# Patient Record
Sex: FEMALE | Race: WHITE | NOT HISPANIC OR LATINO
[De-identification: names, ages, dates, MRNs, and addresses within clinical notes are randomized per-mention and may not be internally consistent; named-entity substitution may affect disease eponyms.]

---

## 2018-08-14 ENCOUNTER — TRANSCRIPTION ENCOUNTER (OUTPATIENT)
Age: 38
End: 2018-08-14

## 2018-08-14 ENCOUNTER — INPATIENT (INPATIENT)
Facility: HOSPITAL | Age: 38
LOS: 0 days | Discharge: HOME | End: 2018-08-15
Attending: SPECIALIST | Admitting: SPECIALIST
Payer: MEDICAID

## 2018-08-14 VITALS
TEMPERATURE: 98 F | RESPIRATION RATE: 16 BRPM | DIASTOLIC BLOOD PRESSURE: 82 MMHG | SYSTOLIC BLOOD PRESSURE: 150 MMHG | HEART RATE: 63 BPM | OXYGEN SATURATION: 100 %

## 2018-08-14 DIAGNOSIS — K80.20 CALCULUS OF GALLBLADDER WITHOUT CHOLECYSTITIS WITHOUT OBSTRUCTION: ICD-10-CM

## 2018-08-14 LAB
ALBUMIN SERPL ELPH-MCNC: 4.3 G/DL — SIGNIFICANT CHANGE UP (ref 3.3–5)
ALP SERPL-CCNC: 68 U/L — SIGNIFICANT CHANGE UP (ref 40–120)
ALT FLD-CCNC: 21 U/L — SIGNIFICANT CHANGE UP (ref 4–33)
APTT BLD: 32.4 SEC — SIGNIFICANT CHANGE UP (ref 27.5–37.4)
AST SERPL-CCNC: 18 U/L — SIGNIFICANT CHANGE UP (ref 4–32)
BILIRUB SERPL-MCNC: 0.3 MG/DL — SIGNIFICANT CHANGE UP (ref 0.2–1.2)
BLD GP AB SCN SERPL QL: NEGATIVE — SIGNIFICANT CHANGE UP
BUN SERPL-MCNC: 10 MG/DL — SIGNIFICANT CHANGE UP (ref 7–23)
CALCIUM SERPL-MCNC: 9.3 MG/DL — SIGNIFICANT CHANGE UP (ref 8.4–10.5)
CHLORIDE SERPL-SCNC: 99 MMOL/L — SIGNIFICANT CHANGE UP (ref 98–107)
CO2 SERPL-SCNC: 21 MMOL/L — LOW (ref 22–31)
CREAT SERPL-MCNC: 0.59 MG/DL — SIGNIFICANT CHANGE UP (ref 0.5–1.3)
GLUCOSE SERPL-MCNC: 104 MG/DL — HIGH (ref 70–99)
HCT VFR BLD CALC: 40.2 % — SIGNIFICANT CHANGE UP (ref 34.5–45)
HGB BLD-MCNC: 13.4 G/DL — SIGNIFICANT CHANGE UP (ref 11.5–15.5)
INR BLD: 1.02 — SIGNIFICANT CHANGE UP (ref 0.88–1.17)
LIDOCAIN IGE QN: 32.1 U/L — SIGNIFICANT CHANGE UP (ref 7–60)
MCHC RBC-ENTMCNC: 27.7 PG — SIGNIFICANT CHANGE UP (ref 27–34)
MCHC RBC-ENTMCNC: 33.3 % — SIGNIFICANT CHANGE UP (ref 32–36)
MCV RBC AUTO: 83.1 FL — SIGNIFICANT CHANGE UP (ref 80–100)
NRBC # FLD: 0 — SIGNIFICANT CHANGE UP
PLATELET # BLD AUTO: 295 K/UL — SIGNIFICANT CHANGE UP (ref 150–400)
PMV BLD: 9.4 FL — SIGNIFICANT CHANGE UP (ref 7–13)
POTASSIUM SERPL-MCNC: 4.1 MMOL/L — SIGNIFICANT CHANGE UP (ref 3.5–5.3)
POTASSIUM SERPL-SCNC: 4.1 MMOL/L — SIGNIFICANT CHANGE UP (ref 3.5–5.3)
PROT SERPL-MCNC: 7.8 G/DL — SIGNIFICANT CHANGE UP (ref 6–8.3)
PROTHROM AB SERPL-ACNC: 11.3 SEC — SIGNIFICANT CHANGE UP (ref 9.8–13.1)
RBC # BLD: 4.84 M/UL — SIGNIFICANT CHANGE UP (ref 3.8–5.2)
RBC # FLD: 12.4 % — SIGNIFICANT CHANGE UP (ref 10.3–14.5)
RH IG SCN BLD-IMP: NEGATIVE — SIGNIFICANT CHANGE UP
SODIUM SERPL-SCNC: 133 MMOL/L — LOW (ref 135–145)
WBC # BLD: 13 K/UL — HIGH (ref 3.8–10.5)
WBC # FLD AUTO: 13 K/UL — HIGH (ref 3.8–10.5)

## 2018-08-14 PROCEDURE — 76705 ECHO EXAM OF ABDOMEN: CPT | Mod: 26

## 2018-08-14 RX ORDER — KETOROLAC TROMETHAMINE 30 MG/ML
15 SYRINGE (ML) INJECTION ONCE
Qty: 0 | Refills: 0 | Status: DISCONTINUED | OUTPATIENT
Start: 2018-08-14 | End: 2018-08-14

## 2018-08-14 RX ORDER — ACETAMINOPHEN 500 MG
1000 TABLET ORAL ONCE
Qty: 0 | Refills: 0 | Status: COMPLETED | OUTPATIENT
Start: 2018-08-14 | End: 2018-08-14

## 2018-08-14 RX ORDER — MORPHINE SULFATE 50 MG/1
4 CAPSULE, EXTENDED RELEASE ORAL ONCE
Qty: 0 | Refills: 0 | Status: DISCONTINUED | OUTPATIENT
Start: 2018-08-14 | End: 2018-08-14

## 2018-08-14 RX ORDER — PIPERACILLIN AND TAZOBACTAM 4; .5 G/20ML; G/20ML
3.38 INJECTION, POWDER, LYOPHILIZED, FOR SOLUTION INTRAVENOUS ONCE
Qty: 0 | Refills: 0 | Status: COMPLETED | OUTPATIENT
Start: 2018-08-14 | End: 2018-08-14

## 2018-08-14 RX ORDER — LIDOCAINE 4 G/100G
10 CREAM TOPICAL ONCE
Qty: 0 | Refills: 0 | Status: COMPLETED | OUTPATIENT
Start: 2018-08-14 | End: 2018-08-14

## 2018-08-14 RX ORDER — SODIUM CHLORIDE 9 MG/ML
1000 INJECTION, SOLUTION INTRAVENOUS
Qty: 0 | Refills: 0 | Status: DISCONTINUED | OUTPATIENT
Start: 2018-08-14 | End: 2018-08-15

## 2018-08-14 RX ADMIN — SODIUM CHLORIDE 100 MILLILITER(S): 9 INJECTION, SOLUTION INTRAVENOUS at 23:29

## 2018-08-14 RX ADMIN — PIPERACILLIN AND TAZOBACTAM 200 GRAM(S): 4; .5 INJECTION, POWDER, LYOPHILIZED, FOR SOLUTION INTRAVENOUS at 17:40

## 2018-08-14 RX ADMIN — Medication 15 MILLIGRAM(S): at 17:39

## 2018-08-14 RX ADMIN — SODIUM CHLORIDE 100 MILLILITER(S): 9 INJECTION, SOLUTION INTRAVENOUS at 18:59

## 2018-08-14 RX ADMIN — Medication 400 MILLIGRAM(S): at 23:28

## 2018-08-14 RX ADMIN — PIPERACILLIN AND TAZOBACTAM 3.38 GRAM(S): 4; .5 INJECTION, POWDER, LYOPHILIZED, FOR SOLUTION INTRAVENOUS at 17:58

## 2018-08-14 RX ADMIN — MORPHINE SULFATE 4 MILLIGRAM(S): 50 CAPSULE, EXTENDED RELEASE ORAL at 17:27

## 2018-08-14 RX ADMIN — LIDOCAINE 10 MILLILITER(S): 4 CREAM TOPICAL at 13:10

## 2018-08-14 RX ADMIN — Medication 15 MILLIGRAM(S): at 17:40

## 2018-08-14 RX ADMIN — MORPHINE SULFATE 4 MILLIGRAM(S): 50 CAPSULE, EXTENDED RELEASE ORAL at 17:40

## 2018-08-14 RX ADMIN — Medication 30 MILLILITER(S): at 13:10

## 2018-08-14 NOTE — PROCEDURE NOTE - ADDITIONAL PROCEDURE DETAILS
Emergency Department Focused Ultrasound performed at patient's bedside for educational purposes. The study will have a follow up US performed by radiology.

## 2018-08-14 NOTE — H&P ADULT - ASSESSMENT
37F w/epigastric and RUQ pain with n obstructing 1.5cm gallstone within the gallbladder neck.      - admit to surgical service under Dr. Garth Boyce  - NPO / IVFs  - type and screen x2, urine HCG, coags appreciated  - plan for the OR this evening for laparoscopic cholecystectomy w/possible intraoperative cholangiogram  - above plan d/w Dr. Boyce      Johan [facility] PGY3  [id number]

## 2018-08-14 NOTE — ED ADULT TRIAGE NOTE - CHIEF COMPLAINT QUOTE
Pt complaining of intermittent abdominal pain since Sunday and N/V. Pt denies chest pain, SOB, fever or chills.

## 2018-08-14 NOTE — ED PROVIDER NOTE - CARE PLAN
Principal Discharge DX:	Epigastric abdominal pain Principal Discharge DX:	Impacted gallstone of gallbladder

## 2018-08-14 NOTE — H&P ADULT - NSHPREVIEWOFSYSTEMS_GEN_ALL_CORE
Review of Systems:   General: denies weight change, fever or fatigue  HEENT: denies sore throat, hoarseness  Respiratory: denies cough, shortness of breath at rest and on exertion, wheezing  Cardiovascular: denies chest pain, abnormal heart rhythm, PND, palpitations  Gastrointestinal: denies nausea, vomiting, diarrhea, bloody or black bowel movements  Genitourinary: denies frequent urination, painful urination, kidney disease  Neurological: denies seizures, headaches  Muscoloskeletal: denies any joint pains  Psychiatric: denies depression, anxiety Review of Systems:   General: denies weight change, fever or fatigue  HEENT: denies sore throat, hoarseness  Respiratory: denies cough, shortness of breath at rest and on exertion, wheezing  Cardiovascular: denies chest pain, abnormal heart rhythm, PND, palpitations  Gastrointestinal: +Epigastric/RUQ pain, currently denies nausea, vomiting, diarrhea, bloody or black bowel movements  Genitourinary: denies frequent urination, painful urination, kidney disease  Neurological: denies seizures, headaches

## 2018-08-14 NOTE — H&P ADULT - NSHPPHYSICALEXAM_GEN_ALL_CORE
Physical Exam:  General: Well developed, well nourished, alert and cooperative, and appears to be in no acute distress.  HEENT: normocephalic, vision is grossly intact  Neck: Neck supple, non-tender without lymphadenopathy, masses or thyromegaly  Chest: lungs clear bilaterally, non-labored breathing, no wheezing or rhonci  Cardiac: Regular rhythm, rate of , +S1 & S2, no murmurs, rubs or gallops  Abdomen: soft, non-distended, non-tender, no guarding or rebound  Extremities: No significant deformity or joint abnormality. No edema. Peripheral pulses intact. No varicosities.  MusculoskeletalL: Adequately aligned spine. ROM intact spine and extremities. No joint erythema or tenderness. Normal muscular development. Normal gait.  Neurological: no focal deficits, strength and sensation symmetric and intact throughout. Reflexes 2+ throughout.   Skin: Skin normal color, texture and turgor with no lesions or eruptions.    LABS:                        13.4   13.00 )-----------( 295      ( 14 Aug 2018 13:25 )             40.2     08-14    133<L>  |  99  |  10  ----------------------------<  104<H>  4.1   |  21<L>  |  0.59    Ca    9.3      14 Aug 2018 13:25    TPro  7.8  /  Alb  4.3  /  TBili  0.3  /  DBili  x   /  AST  18  /  ALT  21  /  AlkPhos  68  08-14    PT/INR - ( 14 Aug 2018 17:28 )   PT: 11.3 SEC;   INR: 1.02          PTT - ( 14 Aug 2018 17:28 )  PTT:32.4 SEC      RADIOLOGY & ADDITIONAL STUDIES: Physical Exam:  General: Well developed, well nourished, alert and cooperative, and appears to be in no acute distress.  HEENT: normocephalic, vision is grossly intact  Neck: Neck supple, non-tender without lymphadenopathy, masses or thyromegaly  Chest: lungs clear bilaterally, non-labored breathing, no wheezing or rhonci  Cardiac: Regular rhythm, rate of 68  Abdomen: soft, +RUQ tenderness, +jennings sign

## 2018-08-14 NOTE — ED PROVIDER NOTE - PROGRESS NOTE DETAILS
Dr. Mobley: I took over care of this pt at 16:00 today; US showing impacted gallstone with normal LFTs and normal AlkPhos; will call surgery Dr. Mobley: general surgery evaluated pt, recommending Zosyn and will speak with attending for possible admission for cholecystectomy Dr. Mobley: spoke to surgery, pt to be admitted to Dr. Boyce for cholecystectomy

## 2018-08-14 NOTE — H&P ADULT - NSHPLABSRESULTS_GEN_ALL_CORE
Vital Signs Last 24 Hrs  T(C): 36.7 (14 Aug 2018 20:09), Max: 36.9 (14 Aug 2018 09:58)  T(F): 98.1 (14 Aug 2018 20:09), Max: 98.5 (14 Aug 2018 09:58)  HR: 70 (14 Aug 2018 20:09) (63 - 70)  BP: 136/66 (14 Aug 2018 20:09) (114/69 - 150/82)  BP(mean): --  RR: 16 (14 Aug 2018 20:09) (16 - 16)  SpO2: 97% (14 Aug 2018 20:09) (97% - 100%)    I&O's Detail                            13.4   13.00 )-----------( 295      ( 14 Aug 2018 13:25 )             40.2       08-14    133<L>  |  99  |  10  ----------------------------<  104<H>  4.1   |  21<L>  |  0.59    Ca    9.3      14 Aug 2018 13:25    TPro  7.8  /  Alb  4.3  /  TBili  0.3  /  DBili  x   /  AST  18  /  ALT  21  /  AlkPhos  68  08-14      CAPILLARY BLOOD GLUCOSE          LIVER FUNCTIONS - ( 14 Aug 2018 13:25 )  Alb: 4.3 g/dL / Pro: 7.8 g/dL / ALK PHOS: 68 u/L / ALT: 21 u/L / AST: 18 u/L / GGT: x             PT/INR - ( 14 Aug 2018 17:28 )   PT: 11.3 SEC;   INR: 1.02          PTT - ( 14 Aug 2018 17:28 )  PTT:32.4 SEC      _________________________________________________________________________________________________________________________________________________________    Radiology  EXAM: US ABDOMEN LIMITED   PROCEDURE DATE: Aug 14 2018   INTERPRETATION: CLINICAL INFORMATION: Epigastric pain.   COMPARISON: None available.   TECHNIQUE: Sonography of the right upper quadrant.     FINDINGS:   Liver: Enlarged measuring 20.3 cm in craniocaudad dimension. Steatosis.   Bile ducts: Normal caliber. Common hepatic duct measures 4 mm.   Gallbladder: 1.5 cm stone impacted in gallbladder neck. No gallbladder wall thickening or pericolic fluid. Negative sonographic Davies's.   Pancreas: Visualized portions are within normal limits.   Right kidney: 10.6 cm. No hydronephrosis.   Ascites: None.   IVC: Visualized portions are within normal limits.     IMPRESSION: 1.5 cm stone impacted in gallbladder neck, however no evidence of acute cholecystitis at this time.

## 2018-08-14 NOTE — ED ADULT NURSE NOTE - NSIMPLEMENTINTERV_GEN_ALL_ED
Implemented All Universal Safety Interventions:  [place] to call system. Call bell, personal items and telephone within reach. Instruct patient to call for assistance. Room bathroom lighting operational. Non-slip footwear when patient is off stretcher. Physically safe environment: no spills, clutter or unnecessary equipment. Stretcher in lowest position, wheels locked, appropriate side rails in place.

## 2018-08-14 NOTE — H&P ADULT - HISTORY OF PRESENT ILLNESS
CC: abdominal pain  HPI: The patient is a 37 year old female with medical and surgical history significant only for EGD-proven gastritis who presents with four days of epigastric / RUQ pain with associated nausea and emesis, a leukocytosis to 13 and subsequent ultrasound imaging showing an obstructing 1.5cm gallstone within the gallbladder neck. No transaminitis or elevated alk phos. Never had symptoms like this prior. Patient is afebrile and hemodynamically stable. Denies shortness of breath or chest pain.     Medical or Surgical History  Anxiety      Review of Systems:   General: denies weight change, fever or fatigue  HEENT: denies sore throat, hoarseness  Respiratory: denies cough, shortness of breath at rest and on exertion, wheezing  Cardiovascular: denies chest pain, abnormal heart rhythm, PND, palpitations  Gastrointestinal: denies nausea, vomiting, diarrhea, bloody or black bowel movements  Genitourinary: denies frequent urination, painful urination, kidney disease  Neurological: denies seizures, headaches  Muscoloskeletal: denies any joint pains  Psychiatric: denies depression, anxiety    MEDICATIONS  (STANDING):  lactated ringers. 1000 milliLiter(s) (100 mL/Hr) IV Continuous <Continuous>    MEDICATIONS  (PRN):    Allergies    No Known Allergies    Intolerances        SOCIAL HISTORY:  Occupation:  Smoking Hx: denies  Etoh Hx: denies  IVDA Hx: denies    FAMILY HISTORY:    - unless noted, no significant family hx with Mother, Father, Siblings      Objective:   Vital Signs Last 24 Hrs  T(C): 36.9 (14 Aug 2018 16:30), Max: 36.9 (14 Aug 2018 09:58)  T(F): 98.4 (14 Aug 2018 16:30), Max: 98.5 (14 Aug 2018 09:58)  HR: 65 (14 Aug 2018 16:30) (63 - 65)  BP: 114/69 (14 Aug 2018 16:30) (114/69 - 150/82)  BP(mean): --  RR: 16 (14 Aug 2018 16:30) (16 - 16)  SpO2: 100% (14 Aug 2018 16:30) (100% - 100%)    Physical Exam:  General: Well developed, well nourished, alert and cooperative, and appears to be in no acute distress.  HEENT: normocephalic, vision is grossly intact  Neck: Neck supple, non-tender without lymphadenopathy, masses or thyromegaly  Chest: lungs clear bilaterally, non-labored breathing, no wheezing or rhonci  Cardiac: Regular rhythm, rate of , +S1 & S2, no murmurs, rubs or gallops  Abdomen: soft, non-distended, non-tender, no guarding or rebound  Extremities: No significant deformity or joint abnormality. No edema. Peripheral pulses intact. No varicosities.  MusculoskeletalL: Adequately aligned spine. ROM intact spine and extremities. No joint erythema or tenderness. Normal muscular development. Normal gait.  Neurological: no focal deficits, strength and sensation symmetric and intact throughout. Reflexes 2+ throughout.   Skin: Skin normal color, texture and turgor with no lesions or eruptions.    LABS:                        13.4   13.00 )-----------( 295      ( 14 Aug 2018 13:25 )             40.2     08-14    133<L>  |  99  |  10  ----------------------------<  104<H>  4.1   |  21<L>  |  0.59    Ca    9.3      14 Aug 2018 13:25    TPro  7.8  /  Alb  4.3  /  TBili  0.3  /  DBili  x   /  AST  18  /  ALT  21  /  AlkPhos  68  08-14    PT/INR - ( 14 Aug 2018 17:28 )   PT: 11.3 SEC;   INR: 1.02          PTT - ( 14 Aug 2018 17:28 )  PTT:32.4 SEC      RADIOLOGY & ADDITIONAL STUDIES: CC: abdominal pain  HPI: The patient is a 37 year old female with medical and surgical history significant only for EGD-proven gastritis who presents with four days of epigastric / RUQ pain with associated nausea and emesis, a leukocytosis to 13 and subsequent ultrasound imaging showing an obstructing 1.5cm gallstone within the gallbladder neck. No transaminitis or elevated alk phos. Never had symptoms like this prior. Patient is afebrile and hemodynamically stable. Denies shortness of breath or chest pain. Patient will need to be admitted to the surgical service with plans for laparoscopic cholecystectomy.    Medical or Surgical History  Anxiety  gastritis    Allergies  - NKDA    Medications  - Sertraline 50mg daily  - CC: abdominal pain  HPI: The patient is a 37 year old female with medical and surgical history significant only for EGD-proven gastritis who presents with four days of epigastric / RUQ pain with associated nausea and emesis, a leukocytosis to 13 and subsequent ultrasound imaging showing an obstructing 1.5cm gallstone within the gallbladder neck. No transaminitis or elevated alk phos. Never had symptoms like this prior. Patient is afebrile and hemodynamically stable. Denies shortness of breath or chest pain. Patient will need to be admitted to the surgical service with plans for laparoscopic cholecystectomy.    Medical or Surgical History  Anxiety  gastritis    Allergies  - NKDA    Medications  - Sertraline 50mg daily  - Clonazepam .25mg prn daily

## 2018-08-15 ENCOUNTER — TRANSCRIPTION ENCOUNTER (OUTPATIENT)
Age: 38
End: 2018-08-15

## 2018-08-15 VITALS
RESPIRATION RATE: 18 BRPM | HEART RATE: 65 BPM | TEMPERATURE: 98 F | OXYGEN SATURATION: 96 % | DIASTOLIC BLOOD PRESSURE: 61 MMHG | SYSTOLIC BLOOD PRESSURE: 107 MMHG

## 2018-08-15 LAB
ALBUMIN SERPL ELPH-MCNC: 3.7 G/DL — SIGNIFICANT CHANGE UP (ref 3.3–5)
ALP SERPL-CCNC: 72 U/L — SIGNIFICANT CHANGE UP (ref 40–120)
ALT FLD-CCNC: 49 U/L — HIGH (ref 4–33)
AST SERPL-CCNC: 51 U/L — HIGH (ref 4–32)
BILIRUB SERPL-MCNC: 0.7 MG/DL — SIGNIFICANT CHANGE UP (ref 0.2–1.2)
BLD GP AB SCN SERPL QL: NEGATIVE — SIGNIFICANT CHANGE UP
BUN SERPL-MCNC: 10 MG/DL — SIGNIFICANT CHANGE UP (ref 7–23)
CALCIUM SERPL-MCNC: 8.5 MG/DL — SIGNIFICANT CHANGE UP (ref 8.4–10.5)
CHLORIDE SERPL-SCNC: 99 MMOL/L — SIGNIFICANT CHANGE UP (ref 98–107)
CO2 SERPL-SCNC: 22 MMOL/L — SIGNIFICANT CHANGE UP (ref 22–31)
CREAT SERPL-MCNC: 0.72 MG/DL — SIGNIFICANT CHANGE UP (ref 0.5–1.3)
GLUCOSE SERPL-MCNC: 137 MG/DL — HIGH (ref 70–99)
HCT VFR BLD CALC: 37.9 % — SIGNIFICANT CHANGE UP (ref 34.5–45)
HGB BLD-MCNC: 12.6 G/DL — SIGNIFICANT CHANGE UP (ref 11.5–15.5)
MAGNESIUM SERPL-MCNC: 2 MG/DL — SIGNIFICANT CHANGE UP (ref 1.6–2.6)
MCHC RBC-ENTMCNC: 27.7 PG — SIGNIFICANT CHANGE UP (ref 27–34)
MCHC RBC-ENTMCNC: 33.2 % — SIGNIFICANT CHANGE UP (ref 32–36)
MCV RBC AUTO: 83.3 FL — SIGNIFICANT CHANGE UP (ref 80–100)
NRBC # FLD: 0 — SIGNIFICANT CHANGE UP
PHOSPHATE SERPL-MCNC: 2.1 MG/DL — LOW (ref 2.5–4.5)
PLATELET # BLD AUTO: 246 K/UL — SIGNIFICANT CHANGE UP (ref 150–400)
PMV BLD: 10 FL — SIGNIFICANT CHANGE UP (ref 7–13)
POTASSIUM SERPL-MCNC: 3.9 MMOL/L — SIGNIFICANT CHANGE UP (ref 3.5–5.3)
POTASSIUM SERPL-SCNC: 3.9 MMOL/L — SIGNIFICANT CHANGE UP (ref 3.5–5.3)
PROT SERPL-MCNC: 7 G/DL — SIGNIFICANT CHANGE UP (ref 6–8.3)
RBC # BLD: 4.55 M/UL — SIGNIFICANT CHANGE UP (ref 3.8–5.2)
RBC # FLD: 12.7 % — SIGNIFICANT CHANGE UP (ref 10.3–14.5)
RH IG SCN BLD-IMP: NEGATIVE — SIGNIFICANT CHANGE UP
SODIUM SERPL-SCNC: 135 MMOL/L — SIGNIFICANT CHANGE UP (ref 135–145)
WBC # BLD: 11.06 K/UL — HIGH (ref 3.8–10.5)
WBC # FLD AUTO: 11.06 K/UL — HIGH (ref 3.8–10.5)

## 2018-08-15 PROCEDURE — 74018 RADEX ABDOMEN 1 VIEW: CPT | Mod: 26

## 2018-08-15 PROCEDURE — 88304 TISSUE EXAM BY PATHOLOGIST: CPT | Mod: 26

## 2018-08-15 RX ORDER — HYDROMORPHONE HYDROCHLORIDE 2 MG/ML
0.5 INJECTION INTRAMUSCULAR; INTRAVENOUS; SUBCUTANEOUS
Qty: 0 | Refills: 0 | Status: DISCONTINUED | OUTPATIENT
Start: 2018-08-15 | End: 2018-08-15

## 2018-08-15 RX ORDER — ACETAMINOPHEN 500 MG
1000 TABLET ORAL ONCE
Qty: 0 | Refills: 0 | Status: COMPLETED | OUTPATIENT
Start: 2018-08-15 | End: 2018-08-15

## 2018-08-15 RX ORDER — OXYCODONE HYDROCHLORIDE 5 MG/1
5 TABLET ORAL EVERY 4 HOURS
Qty: 0 | Refills: 0 | Status: DISCONTINUED | OUTPATIENT
Start: 2018-08-15 | End: 2018-08-15

## 2018-08-15 RX ORDER — OXYCODONE HYDROCHLORIDE 5 MG/1
1 TABLET ORAL
Qty: 12 | Refills: 0 | OUTPATIENT
Start: 2018-08-15 | End: 2018-08-17

## 2018-08-15 RX ORDER — OXYCODONE HYDROCHLORIDE 5 MG/1
1 TABLET ORAL
Qty: 12 | Refills: 0
Start: 2018-08-15 | End: 2018-08-17

## 2018-08-15 RX ORDER — ONDANSETRON 8 MG/1
4 TABLET, FILM COATED ORAL ONCE
Qty: 0 | Refills: 0 | Status: DISCONTINUED | OUTPATIENT
Start: 2018-08-15 | End: 2018-08-15

## 2018-08-15 RX ADMIN — Medication 1000 MILLIGRAM(S): at 06:42

## 2018-08-15 RX ADMIN — Medication 1000 MILLIGRAM(S): at 00:07

## 2018-08-15 RX ADMIN — HYDROMORPHONE HYDROCHLORIDE 0.5 MILLIGRAM(S): 2 INJECTION INTRAMUSCULAR; INTRAVENOUS; SUBCUTANEOUS at 05:56

## 2018-08-15 RX ADMIN — OXYCODONE HYDROCHLORIDE 5 MILLIGRAM(S): 5 TABLET ORAL at 14:45

## 2018-08-15 RX ADMIN — OXYCODONE HYDROCHLORIDE 5 MILLIGRAM(S): 5 TABLET ORAL at 09:45

## 2018-08-15 RX ADMIN — SODIUM CHLORIDE 100 MILLILITER(S): 9 INJECTION, SOLUTION INTRAVENOUS at 14:45

## 2018-08-15 RX ADMIN — OXYCODONE HYDROCHLORIDE 5 MILLIGRAM(S): 5 TABLET ORAL at 09:07

## 2018-08-15 RX ADMIN — Medication 1000 MILLIGRAM(S): at 14:30

## 2018-08-15 RX ADMIN — HYDROMORPHONE HYDROCHLORIDE 0.5 MILLIGRAM(S): 2 INJECTION INTRAMUSCULAR; INTRAVENOUS; SUBCUTANEOUS at 05:34

## 2018-08-15 RX ADMIN — Medication 400 MILLIGRAM(S): at 05:59

## 2018-08-15 RX ADMIN — SODIUM CHLORIDE 100 MILLILITER(S): 9 INJECTION, SOLUTION INTRAVENOUS at 13:41

## 2018-08-15 RX ADMIN — OXYCODONE HYDROCHLORIDE 5 MILLIGRAM(S): 5 TABLET ORAL at 15:20

## 2018-08-15 RX ADMIN — SODIUM CHLORIDE 100 MILLILITER(S): 9 INJECTION, SOLUTION INTRAVENOUS at 09:08

## 2018-08-15 RX ADMIN — Medication 400 MILLIGRAM(S): at 13:41

## 2018-08-15 NOTE — DISCHARGE NOTE ADULT - HOSPITAL COURSE
The patient is a 37 year old female with medical and surgical history significant only for EGD-proven gastritis who presents with four days of epigastric / RUQ pain with associated nausea and emesis, a leukocytosis to 13 and subsequent ultrasound imaging showing an obstructing 1.5cm gallstone within the gallbladder neck. No transaminitis or elevated alk phos. Never had symptoms like this prior. Patient is afebrile and hemodynamically stable. Denies shortness of breath or chest pain. Patient will need to be admitted to the surgical service with plans for laparoscopic cholecystectomy.  Patient taken to the OR for a laparoscopic cholecystectomy. Post operatively patient hemodynamically stable and transferred to the floor. Pain well controlled, diet advanced and tolerated, and patient OOB and ambulating.  Patient stable for discharge home to follow up as an outpatient.  I-STOP REF# #: [id number]

## 2018-08-15 NOTE — DISCHARGE NOTE ADULT - INSTRUCTIONS
follow a lowfat diet Watch for signs of infection; redness, swelling, fever, chills or heat, report such symptoms to the MD. No driving while taking pain medication, it causes drowsiness & constipation. Drink 6-8 glasses of fluids daily to promote hydration. No heavy lifting, pulling or pushing heavy objects. Follow up with the MD.

## 2018-08-15 NOTE — DISCHARGE NOTE ADULT - CARE PROVIDER_API CALL
Garth Boyce), Surgery  [address] [facility]  [address] [place] [id number]  Phone: [phone]  Fax: [phone]

## 2018-08-15 NOTE — PROGRESS NOTE ADULT - SUBJECTIVE AND OBJECTIVE BOX
SUBJECTIVE:    Patient doing well s/p lap hector POD 1   Pain controlled, afebrile, VSS.   Patient tolerating regular PO intake well.       OBJECTIVE:     ** VITAL SIGNS / I&O's **    T(C): 37.1 (08-15-18 @ 10:40), Max: 37.1 (08-15-18 @ 04:55)  T(F): 98.8 (08-15-18 @ 10:40), Max: 98.8 (08-15-18 @ 04:55)  HR: 73 (08-15-18 @ 10:40) (61 - 75)  BP: 100/57 (08-15-18 @ 10:40) (90/70 - 136/66)  RR: 18 (08-15-18 @ 10:40) (14 - 20)  SpO2: 96% (08-15-18 @ 10:40) (95% - 100%)      14 Aug 2018 07:01  -  15 Aug 2018 07:00  --------------------------------------------------------  IN:    IV PiggyBack: 100 mL    lactated ringers.: 200 mL  Total IN: 300 mL    OUT:  Total OUT: 0 mL    Total NET: 300 mL      15 Aug 2018 07:01  -  15 Aug 2018 11:15  --------------------------------------------------------  IN:    lactated ringers.: 100 mL  Total IN: 100 mL    OUT:    Voided: 750 mL  Total OUT: 750 mL    Total NET: -650 mL          ** PHYSICAL EXAM **    -- CONSTITUTIONAL: AOx3. NAD.   -- ABDOMEN: Soft, non tender, non distended, no gaurding/rebound. Incisions CDI.     ** LABS **                 12.6   11.06  )----------(  246       ( 15 Aug 2018 06:48 )               37.9      135    |  99     |  10     ----------------------------<  137        ( 15 Aug 2018 06:39 )  3.9     |  22     |  0.72     Ca    8.5        ( 15 Aug 2018 06:39 )  Phos  2.1       ( 15 Aug 2018 06:39 )  Mg     2.0       ( 15 Aug 2018 06:39 )    TPro  7.0    /  Alb  3.7    /  TBili  0.7    /  DBili  x      /  AST  51     /  ALT  49     /  AlkPhos  72     ( 15 Aug 2018 06:39 )    PT/INR -  11.3 SEC / 1.02    ( 14 Aug 2018 17:28 )       PTT -  32.4 SEC   ( 14 Aug 2018 17:28 )  CAPILLARY BLOOD GLUCOSE

## 2018-08-15 NOTE — PROGRESS NOTE ADULT - ASSESSMENT
37F w/epigastric and RUQ pain with n obstructing 1.5cm gallstone within the gallbladder neck, now s/p lap hector POD 1 doing well;       - Regular diet   - Pain control  - VTE ppx   - OOB ambulating   - WIll DC home after lunch

## 2018-08-15 NOTE — DISCHARGE NOTE ADULT - CARE PLAN
Principal Discharge DX:	Impacted gallstone of gallbladder  Goal:	You went to the OR for a laparoscopic cholecystectomy  Assessment and plan of treatment:	Follow up with Dr. Boyce in one week, call for an appointment. Please follow up with your primary care physician regarding your hospitalization. Do not drive while taking pain medications

## 2018-08-15 NOTE — DISCHARGE NOTE ADULT - PATIENT PORTAL LINK FT
You can access the [email or web address] Patient Portal, offered by [facility], by registering with the following website: [email or web address]

## 2018-08-15 NOTE — BRIEF OPERATIVE NOTE - PROCEDURE
<<-----Click on this checkbox to enter Procedure Laparoscopic cholecystectomy  08/15/2018    Active  [id number]

## 2018-08-15 NOTE — DISCHARGE NOTE ADULT - PLAN OF CARE
You went to the OR for a laparoscopic cholecystectomy Follow up with Dr. Boyce in one week, call for an appointment. Please follow up with your primary care physician regarding your hospitalization. Do not drive while taking pain medications

## 2018-08-15 NOTE — DISCHARGE NOTE ADULT - MEDICATION SUMMARY - MEDICATIONS TO TAKE
I will START or STAY ON the medications listed below when I get home from the hospital:    oxyCODONE 5 mg oral tablet  -- 1 tab(s) by mouth every 6 hours, As Needed -Moderate to severe pain MDD:6  -- Indication: For pain control

## 2018-08-22 LAB — SURGICAL PATHOLOGY STUDY: SIGNIFICANT CHANGE UP

## 2018-09-02 ENCOUNTER — EMERGENCY (EMERGENCY)
Facility: HOSPITAL | Age: 38
LOS: 1 days | Discharge: HOME | End: 2018-09-02
Admitting: EMERGENCY MEDICINE
Payer: MEDICAID

## 2018-09-02 VITALS
TEMPERATURE: 98 F | DIASTOLIC BLOOD PRESSURE: 77 MMHG | OXYGEN SATURATION: 98 % | RESPIRATION RATE: 16 BRPM | HEART RATE: 65 BPM | SYSTOLIC BLOOD PRESSURE: 125 MMHG

## 2018-09-02 PROCEDURE — 99281 EMR DPT VST MAYX REQ PHY/QHP: CPT

## 2018-09-02 NOTE — ED ADULT TRIAGE NOTE - CHIEF COMPLAINT QUOTE
Pt arrives to ED for staple removal from surgery 3 weeks ago.  Pt had appointment with surgeon on 8/29 for staple removal however the surgeon cancelled the appointment and has not rescheduled for pt.

## 2018-09-03 DIAGNOSIS — Z90.49 ACQUIRED ABSENCE OF OTHER SPECIFIED PARTS OF DIGESTIVE TRACT: Chronic | ICD-10-CM

## 2018-09-03 PROBLEM — F41.9 ANXIETY DISORDER, UNSPECIFIED: Chronic | Status: ACTIVE | Noted: 2018-08-14

## 2018-09-03 PROBLEM — K29.00 ACUTE GASTRITIS WITHOUT BLEEDING: Chronic | Status: ACTIVE | Noted: 2018-08-14

## 2018-09-03 NOTE — ED PROVIDER NOTE - OBJECTIVE STATEMENT
36 y/o F no PMH here for staple removal to abdomen s/p lap hector >2 weeks ago with Dr Boyce. Pt states she had an appt for staple removal on 8/29 but appt was cancelled and has not been able to reschedule. Denies fever, worsening pain, purulent drainage, erythema.

## 2018-09-03 NOTE — ED PROVIDER NOTE - MEDICAL DECISION MAKING DETAILS
36 y/o F here for staple removal from lap hector; wound cdi ready for staples to be removed; will discuss with sx team if they prefer to see pt and remove staples or if ok to be done by ED

## 2018-11-25 NOTE — PATIENT PROFILE ADULT. - NS PRO AD PATIENT TYPE
Discussion/Summary   LAB TEST RESULTS ARE NORMAL   HAVE A GOOD WEEK  JUANI BARNETT MD        Verified Results  COMP METABOLIC PANEL WITH CBCA (CPNL,CBCA) [id number] 05:20PM JUANI BARNETT     Test Name Result Flag Reference   WHITE BLOOD COUNT 7.9 K/mcL  4.2-11.0   RED CELL COUNT 4.67 mil/mcL  4.50-5.90   HEMOGLOBIN 14.7 g/dl  13.0-17.0   HEMATOCRIT 43.2 %  39.0-51.0   MEAN CORPUSCULAR VOLUME 92.5 fL  78.0-100.0   MEAN CORPUSCULAR HEMOGLOBIN 31.5 pg  26.0-34.0   MEAN CORPUSCULAR HGB CONC 34.0 g/dl  32.0-36.5   RDW-CV 13.3 %  11.0-15.0   PLATELET COUNT 342 K/mcL  140-450   DIFF TYPE      AUTOMATED DIFFERENTIAL   JANAE% 49 %     LYM% 42 %     MON% 7 %     EOS% 1 %     BASO% 1 %     JANAE ABS 3.9 K/mcL  1.8-7.7   LYM ABS 3.3 K/mcL  1.0-4.8   MON ABS 0.5 K/mcL  0.3-0.9   EOS ABS 0.1 K/mcL  0.1-0.5   BASO ABS 0.0 K/mcL  0.0-0.3   FASTING STATUS UNKNOWN hrs     SODIUM 139 mmol/L  135-145   POTASSIUM 4.1 mmol/L  3.4-5.1   CHLORIDE 102 mmol/L     CARBON DIOXIDE 26 mmol/L  21-32   ANION GAP 15 mmol/L  10-20   GLUCOSE 91 mg/dl  65-99   BUN 11 mg/dl  6-20   CREATININE 1.10 mg/dl  0.67-1.17   GFR EST.AFRICAN AMER >90     eGFR results = or >90 mL/min/1.73m2 = Normal kidney function.   GFR EST.NONAFRI AMER 79     eGFR 60 - 89 mL/min/1.73m2 = Mild decrease in kidney function.   BUN/CREATININE RATIO 10  7-25   CALCIUM 9.1 mg/dl  8.4-10.2   BILIRUBIN TOTAL 0.5 mg/dl  0.2-1.0   GOT/AST 26 Units/L  <38   GPT/ALT 35 Units/L  <79   ALKALINE PHOSPHATASE 83 Units/L     TOTAL PROTEIN 8.0 g/dl  6.4-8.2   ALBUMIN 4.3 g/dl  3.6-5.1   GLOBULIN (CALCULATED) 3.7 g/dl  2.0-4.0   A/G RATIO 1.2  1.0-2.4        Health Care Proxy (HCP)

## 2018-12-26 ENCOUNTER — EMERGENCY (EMERGENCY)
Facility: HOSPITAL | Age: 38
LOS: 1 days | Discharge: HOME | End: 2018-12-26
Attending: EMERGENCY MEDICINE | Admitting: EMERGENCY MEDICINE
Payer: MEDICAID

## 2018-12-26 VITALS
SYSTOLIC BLOOD PRESSURE: 139 MMHG | OXYGEN SATURATION: 100 % | TEMPERATURE: 98 F | HEART RATE: 102 BPM | RESPIRATION RATE: 16 BRPM | DIASTOLIC BLOOD PRESSURE: 60 MMHG

## 2018-12-26 DIAGNOSIS — Z90.49 ACQUIRED ABSENCE OF OTHER SPECIFIED PARTS OF DIGESTIVE TRACT: Chronic | ICD-10-CM

## 2018-12-26 LAB
ALBUMIN SERPL ELPH-MCNC: 4.5 G/DL — SIGNIFICANT CHANGE UP (ref 3.3–5)
ALP SERPL-CCNC: 71 U/L — SIGNIFICANT CHANGE UP (ref 40–120)
ALT FLD-CCNC: 33 U/L — SIGNIFICANT CHANGE UP (ref 4–33)
APTT BLD: 30.2 SEC — SIGNIFICANT CHANGE UP (ref 27.5–36.3)
AST SERPL-CCNC: 22 U/L — SIGNIFICANT CHANGE UP (ref 4–32)
BASOPHILS # BLD AUTO: 0.03 K/UL — SIGNIFICANT CHANGE UP (ref 0–0.2)
BASOPHILS NFR BLD AUTO: 0.4 % — SIGNIFICANT CHANGE UP (ref 0–2)
BILIRUB SERPL-MCNC: 0.4 MG/DL — SIGNIFICANT CHANGE UP (ref 0.2–1.2)
BUN SERPL-MCNC: 19 MG/DL — SIGNIFICANT CHANGE UP (ref 7–23)
CALCIUM SERPL-MCNC: 9.5 MG/DL — SIGNIFICANT CHANGE UP (ref 8.4–10.5)
CHLORIDE SERPL-SCNC: 103 MMOL/L — SIGNIFICANT CHANGE UP (ref 98–107)
CK MB BLD-MCNC: 1.55 NG/ML — SIGNIFICANT CHANGE UP (ref 1–4.7)
CK MB BLD-MCNC: SIGNIFICANT CHANGE UP (ref 0–2.5)
CK SERPL-CCNC: 102 U/L — SIGNIFICANT CHANGE UP (ref 25–170)
CO2 SERPL-SCNC: 23 MMOL/L — SIGNIFICANT CHANGE UP (ref 22–31)
CREAT SERPL-MCNC: 0.68 MG/DL — SIGNIFICANT CHANGE UP (ref 0.5–1.3)
EOSINOPHIL # BLD AUTO: 0.1 K/UL — SIGNIFICANT CHANGE UP (ref 0–0.5)
EOSINOPHIL NFR BLD AUTO: 1.3 % — SIGNIFICANT CHANGE UP (ref 0–6)
GLUCOSE SERPL-MCNC: 97 MG/DL — SIGNIFICANT CHANGE UP (ref 70–99)
HCT VFR BLD CALC: 42.5 % — SIGNIFICANT CHANGE UP (ref 34.5–45)
HGB BLD-MCNC: 14.2 G/DL — SIGNIFICANT CHANGE UP (ref 11.5–15.5)
IMM GRANULOCYTES # BLD AUTO: 0.02 # — SIGNIFICANT CHANGE UP
IMM GRANULOCYTES NFR BLD AUTO: 0.3 % — SIGNIFICANT CHANGE UP (ref 0–1.5)
INR BLD: 1.1 — SIGNIFICANT CHANGE UP (ref 0.88–1.17)
LYMPHOCYTES # BLD AUTO: 2.67 K/UL — SIGNIFICANT CHANGE UP (ref 1–3.3)
LYMPHOCYTES # BLD AUTO: 33.7 % — SIGNIFICANT CHANGE UP (ref 13–44)
MCHC RBC-ENTMCNC: 27.4 PG — SIGNIFICANT CHANGE UP (ref 27–34)
MCHC RBC-ENTMCNC: 33.4 % — SIGNIFICANT CHANGE UP (ref 32–36)
MCV RBC AUTO: 82 FL — SIGNIFICANT CHANGE UP (ref 80–100)
MONOCYTES # BLD AUTO: 0.91 K/UL — HIGH (ref 0–0.9)
MONOCYTES NFR BLD AUTO: 11.5 % — SIGNIFICANT CHANGE UP (ref 2–14)
NEUTROPHILS # BLD AUTO: 4.19 K/UL — SIGNIFICANT CHANGE UP (ref 1.8–7.4)
NEUTROPHILS NFR BLD AUTO: 52.8 % — SIGNIFICANT CHANGE UP (ref 43–77)
NRBC # FLD: 0 — SIGNIFICANT CHANGE UP
PLATELET # BLD AUTO: 298 K/UL — SIGNIFICANT CHANGE UP (ref 150–400)
PMV BLD: 9.4 FL — SIGNIFICANT CHANGE UP (ref 7–13)
POTASSIUM SERPL-MCNC: 4.3 MMOL/L — SIGNIFICANT CHANGE UP (ref 3.5–5.3)
POTASSIUM SERPL-SCNC: 4.3 MMOL/L — SIGNIFICANT CHANGE UP (ref 3.5–5.3)
PROT SERPL-MCNC: 7.8 G/DL — SIGNIFICANT CHANGE UP (ref 6–8.3)
PROTHROM AB SERPL-ACNC: 12.3 SEC — SIGNIFICANT CHANGE UP (ref 9.8–13.1)
RBC # BLD: 5.18 M/UL — SIGNIFICANT CHANGE UP (ref 3.8–5.2)
RBC # FLD: 12.3 % — SIGNIFICANT CHANGE UP (ref 10.3–14.5)
SODIUM SERPL-SCNC: 139 MMOL/L — SIGNIFICANT CHANGE UP (ref 135–145)
WBC # BLD: 7.92 K/UL — SIGNIFICANT CHANGE UP (ref 3.8–10.5)
WBC # FLD AUTO: 7.92 K/UL — SIGNIFICANT CHANGE UP (ref 3.8–10.5)

## 2018-12-26 PROCEDURE — 93971 EXTREMITY STUDY: CPT | Mod: 26,LT

## 2018-12-26 PROCEDURE — 99218: CPT

## 2018-12-26 PROCEDURE — 73552 X-RAY EXAM OF FEMUR 2/>: CPT | Mod: 26,RT

## 2018-12-26 RX ORDER — DIAZEPAM 5 MG
5 TABLET ORAL ONCE
Qty: 0 | Refills: 0 | Status: DISCONTINUED | OUTPATIENT
Start: 2018-12-26 | End: 2018-12-26

## 2018-12-26 RX ORDER — KETOROLAC TROMETHAMINE 30 MG/ML
15 SYRINGE (ML) INJECTION ONCE
Qty: 0 | Refills: 0 | Status: DISCONTINUED | OUTPATIENT
Start: 2018-12-26 | End: 2018-12-26

## 2018-12-26 RX ORDER — ACETAMINOPHEN 500 MG
650 TABLET ORAL ONCE
Qty: 0 | Refills: 0 | Status: COMPLETED | OUTPATIENT
Start: 2018-12-26 | End: 2018-12-26

## 2018-12-26 RX ORDER — OXYCODONE AND ACETAMINOPHEN 5; 325 MG/1; MG/1
1 TABLET ORAL ONCE
Qty: 0 | Refills: 0 | Status: DISCONTINUED | OUTPATIENT
Start: 2018-12-26 | End: 2018-12-26

## 2018-12-26 RX ORDER — IBUPROFEN 200 MG
600 TABLET ORAL ONCE
Qty: 0 | Refills: 0 | Status: COMPLETED | OUTPATIENT
Start: 2018-12-26 | End: 2018-12-26

## 2018-12-26 RX ADMIN — Medication 650 MILLIGRAM(S): at 17:38

## 2018-12-26 RX ADMIN — OXYCODONE AND ACETAMINOPHEN 1 TABLET(S): 5; 325 TABLET ORAL at 21:25

## 2018-12-26 RX ADMIN — Medication 600 MILLIGRAM(S): at 17:38

## 2018-12-26 RX ADMIN — Medication 5 MILLIGRAM(S): at 17:38

## 2018-12-26 RX ADMIN — Medication 15 MILLIGRAM(S): at 23:36

## 2018-12-26 NOTE — ED PROVIDER NOTE - PRINCIPAL DIAGNOSIS
Advised to call immediately if any new distortion or blurring is noted. Thigh pain, musculoskeletal, right

## 2018-12-26 NOTE — ED PROVIDER NOTE - OBJECTIVE STATEMENT
39 y/o F presents to the ED with complaint of R side leg pain that radiates down the leg. Pt is currently in unbearable pain and feels like she cant sit down.   PMH/PSH: Acute gastritis without hemorrhage, unspecified gastritis type, Anxiety    FH/SH: Father history of blood clot. DVT  Allergies: No known drug allergies  Immunizations: Up-to-date  Medications: Oxyconton 39 y/o F presents to the ED with complaint of R side leg pain that radiates down the leg for > 1 wk. Pt is currently in unbearable pain and feels like she cant sit down.   PMH/PSH: Acute gastritis without hemorrhage, unspecified gastritis type, Anxiety    FH/SH: Father history of blood clot. DVT  Allergies: No known drug allergies  Immunizations: Up-to-date  Medications: Oxyconton 39 y/o F with h/o 3 disc prolapse presents to the ED with complaint of R side leg pain that radiates down the leg for > 1 wk. Pt is currently in unbearable pain and feels like she cant sit down.  No fall or trauma recently but did fell 2 months ago.Pt denies any back pain, difficulty urinating, defecating, any focla weakness or numbness. Pt states that pain is worse when she sits and flexes her hip and is better with standing and laying prone.   PMH/PSH: Acute gastritis without hemorrhage, unspecified gastritis type, Anxiety    FH/SH: Father history of blood clot. DVT  Allergies: No known drug allergies  Immunizations: Up-to-date  Medications: Oxyconton

## 2018-12-26 NOTE — ED CDU PROVIDER INITIAL DAY NOTE - ATTENDING CONTRIBUTION TO CARE
Hemant SPAULDING- 37 y/o F with h/o 3 disc prolapse presents to the ED with complaint of R side leg pain that radiates down the leg for > 1 wk. Pt is currently in unbearable pain and feels like she cant sit down.  No fall or trauma recently but did fell 2 months ago.Pt denies any back pain, difficulty urinating, defecating, any focla weakness or numbness. Pt states that pain is worse when she sits and flexes her hip and is better with standing and laying prone.     Pt is alert, well appearing female, s1s2 normal reg, b/l clear breath sounds, abd soft, nt, nd, normal bowel sounds, no cvat b/l, rt thigh mildly swollen and painful rom at rt thigh, no redness, skin warm, dry, good turgor    plan to do mri for intractable thigh pain

## 2018-12-26 NOTE — ED CDU PROVIDER INITIAL DAY NOTE - OBJECTIVE STATEMENT
38 year old female with no pertinent PMHx pw atraumatic right lower posterior thigh pain for 2-3 week which has worsened for the past 5 days. Pt states that she has been using valium/percocet with mild relief which she was prescribed from an Urgent Care 5 days ago. Pain is constant and is worse when standing. Pt is ambulatory. Denies n/v/f/c, CP, SOB, abdominal pain, dysuria, hematuria, melena, hematochezia, back pain, numbness/weakness/tingling of extremity.  In the ED: US/xray WNL - sent to the CDU for MRI to r/o malignancy vs muscle strain. 38 year old female with no pertinent PMHx pw right lower posterior thigh pain for 2-3 week which has worsened for the past 5 days. She had a fall 2-3 months ago down stairs onto her bottom. Pt states that she has been using valium/percocet with mild relief which she was prescribed from an Urgent Care 5 days ago. Pain is constant and is worse when standing. Pt is ambulatory. Denies n/v/f/c, CP, SOB, abdominal pain, dysuria, hematuria, melena, hematochezia, back pain, numbness/weakness/tingling of extremity.  In the ED: US/xray WNL - sent to the CDU for MRI to r/o malignancy vs muscle strain.

## 2018-12-26 NOTE — ED ADULT NURSE NOTE - CAS EDP DISCH TYPE
Post-Care Instructions: I reviewed with the patient in detail post-care instructions. Patient is to keep the biopsy site dry overnight, and then apply vaseline twice daily until healed. Home

## 2018-12-26 NOTE — ED CDU PROVIDER INITIAL DAY NOTE - MEDICAL DECISION MAKING DETAILS
38 year old female with no pertinent PMHx pw atraumatic right lower posterior thigh pain for 2-3 week which has worsened for the past 5 days.   Plan: pain management, MRI

## 2018-12-26 NOTE — ED PROVIDER NOTE - CONDUCTED A DETAILED DISCUSSION WITH PATIENT AND/OR GUARDIAN REGARDING, MDM
need for outpatient follow-up/radiology results/return to ED if symptoms worsen, persist or questions arise need to admit/lab results/radiology results

## 2018-12-26 NOTE — ED PROVIDER NOTE - PROGRESS NOTE DETAILS
Hemant SPAULDING- pt felt transiently better but still has lot of pain and difficulty sitting and ambulating, US AND XR OF LEG WERE NEG, WILL DO MRI femur to r/o muscle tear, strain vs mass.Pt placed in cdu

## 2018-12-26 NOTE — ED ADULT TRIAGE NOTE - CHIEF COMPLAINT QUOTE
pt comes to ED for back pain that travels down her legs pt was dx with sciatica pt was given muscle relaxers without relief. pt VSs NAD pt is ambulatory to triage.

## 2018-12-27 VITALS
SYSTOLIC BLOOD PRESSURE: 137 MMHG | RESPIRATION RATE: 16 BRPM | HEART RATE: 65 BPM | DIASTOLIC BLOOD PRESSURE: 63 MMHG | TEMPERATURE: 98 F | OXYGEN SATURATION: 100 %

## 2018-12-27 LAB — HBA1C BLD-MCNC: 5.6 % — SIGNIFICANT CHANGE UP (ref 4–5.6)

## 2018-12-27 PROCEDURE — 73720 MRI LWR EXTREMITY W/O&W/DYE: CPT | Mod: 26,RT

## 2018-12-27 PROCEDURE — 99217: CPT

## 2018-12-27 RX ORDER — MORPHINE SULFATE 50 MG/1
4 CAPSULE, EXTENDED RELEASE ORAL ONCE
Qty: 0 | Refills: 0 | Status: DISCONTINUED | OUTPATIENT
Start: 2018-12-27 | End: 2018-12-27

## 2018-12-27 RX ORDER — OXYCODONE AND ACETAMINOPHEN 5; 325 MG/1; MG/1
1 TABLET ORAL ONCE
Qty: 0 | Refills: 0 | Status: DISCONTINUED | OUTPATIENT
Start: 2018-12-27 | End: 2018-12-27

## 2018-12-27 RX ORDER — SODIUM CHLORIDE 9 MG/ML
1000 INJECTION INTRAMUSCULAR; INTRAVENOUS; SUBCUTANEOUS ONCE
Qty: 0 | Refills: 0 | Status: COMPLETED | OUTPATIENT
Start: 2018-12-27 | End: 2018-12-27

## 2018-12-27 RX ORDER — KETOROLAC TROMETHAMINE 30 MG/ML
15 SYRINGE (ML) INJECTION ONCE
Qty: 0 | Refills: 0 | Status: DISCONTINUED | OUTPATIENT
Start: 2018-12-27 | End: 2018-12-27

## 2018-12-27 RX ADMIN — Medication 15 MILLIGRAM(S): at 00:06

## 2018-12-27 RX ADMIN — MORPHINE SULFATE 4 MILLIGRAM(S): 50 CAPSULE, EXTENDED RELEASE ORAL at 01:24

## 2018-12-27 RX ADMIN — SODIUM CHLORIDE 1000 MILLILITER(S): 9 INJECTION INTRAMUSCULAR; INTRAVENOUS; SUBCUTANEOUS at 10:18

## 2018-12-27 RX ADMIN — Medication 15 MILLIGRAM(S): at 10:18

## 2018-12-27 RX ADMIN — MORPHINE SULFATE 4 MILLIGRAM(S): 50 CAPSULE, EXTENDED RELEASE ORAL at 04:55

## 2018-12-27 RX ADMIN — MORPHINE SULFATE 4 MILLIGRAM(S): 50 CAPSULE, EXTENDED RELEASE ORAL at 04:40

## 2018-12-27 RX ADMIN — OXYCODONE AND ACETAMINOPHEN 1 TABLET(S): 5; 325 TABLET ORAL at 14:46

## 2018-12-27 RX ADMIN — MORPHINE SULFATE 4 MILLIGRAM(S): 50 CAPSULE, EXTENDED RELEASE ORAL at 01:09

## 2018-12-27 RX ADMIN — Medication 15 MILLIGRAM(S): at 10:35

## 2018-12-27 NOTE — ED CDU PROVIDER DISPOSITION NOTE - CLINICAL COURSE
CDU Att DC Note: Elias  Pt p/w thigh pain, pain controlled medically in CDU. Found ot have bursitis on MRI.  DC'd c NSAID ATC and pmd/ortho f/u.  Ambulating well at time of dc.  Left c family.   I have personally performed a face to face evaluation of this patient including review of the history and focused physical exam.  I have discussed the case and reviewed the plan of care with the PA.

## 2018-12-27 NOTE — ED CDU PROVIDER DISPOSITION NOTE - NSFOLLOWUPINSTRUCTIONS_ED_ALL_ED_FT
Follow up with your primary care provider within 48 hours  Follow up with an orthopedic doctor within one week  Take Naproxen 500mg every 12 hours as needed for pain, take with food  Return to the emergency department with any worsening or concerning symptoms, swelling, numbness/tingling, inability to bear weight or any other concerns.

## 2018-12-27 NOTE — ED CDU PROVIDER SUBSEQUENT DAY NOTE - ATTENDING CONTRIBUTION TO CARE
ED Attending (Dr Griffin): I have personally performed a face to face bedside history and physical examination of this patient.  I have discussed the case with the PA and  I have personally authored the following components: HPI, ROS, Physical Exam and MDM in addition to reviewing and revising the rest of the Provider Note. Pt p/w several days of severe thigh pain with no known source. Pain control, MRI, reassess

## 2018-12-27 NOTE — ED CDU PROVIDER SUBSEQUENT DAY NOTE - PROGRESS NOTE DETAILS
Pt resting comfortably, NAD, VSS, no acute complaints. istop Reference #: [id number] - no results found. Pending MRI CDU Att PN: Elias  Feeling well, no pain at present. MRI pending.  I have personally performed a face to face evaluation of this patient including review of the history and focused physical exam.  I have discussed the case and reviewed the plan of care with the PA.

## 2018-12-27 NOTE — ED CDU PROVIDER SUBSEQUENT DAY NOTE - HISTORY
38 year old female with no pertinent PMHx pw right lower posterior thigh pain for 2-3 week which has worsened for the past 5 days, no source found.  Describes is a "terrible cramping and I want to tear my leg off".  States she is better c morphine. Pain has been constant otherwise.  Is pending MRI.

## 2020-08-17 PROBLEM — Z00.00 ENCOUNTER FOR PREVENTIVE HEALTH EXAMINATION: Noted: 2020-08-17

## 2020-08-18 ENCOUNTER — APPOINTMENT (OUTPATIENT)
Dept: GASTROENTEROLOGY | Facility: CLINIC | Age: 40
End: 2020-08-18
Payer: COMMERCIAL

## 2020-08-18 VITALS
DIASTOLIC BLOOD PRESSURE: 86 MMHG | WEIGHT: 254 LBS | OXYGEN SATURATION: 97 % | BODY MASS INDEX: 38.49 KG/M2 | TEMPERATURE: 98.4 F | HEIGHT: 68 IN | SYSTOLIC BLOOD PRESSURE: 124 MMHG | HEART RATE: 72 BPM

## 2020-08-18 DIAGNOSIS — R94.5 ABNORMAL RESULTS OF LIVER FUNCTION STUDIES: ICD-10-CM

## 2020-08-18 DIAGNOSIS — Z78.9 OTHER SPECIFIED HEALTH STATUS: ICD-10-CM

## 2020-08-18 DIAGNOSIS — K76.0 FATTY (CHANGE OF) LIVER, NOT ELSEWHERE CLASSIFIED: ICD-10-CM

## 2020-08-18 DIAGNOSIS — E66.9 OBESITY, UNSPECIFIED: ICD-10-CM

## 2020-08-18 PROCEDURE — 99244 OFF/OP CNSLTJ NEW/EST MOD 40: CPT

## 2020-08-18 RX ORDER — MULTIVITAMIN
TABLET ORAL
Refills: 0 | Status: ACTIVE | COMMUNITY

## 2020-08-18 NOTE — ASSESSMENT
[FreeTextEntry1] : 1- NAFLD - Discuss fatty liver disease at length with the patient. Encouraged diet, exercise, and weight loss.\par The patient received education including the attached :\par \par Nonalcoholic fatty liver disease (NAFLD) is an umbrella term for a range of liver conditions affecting people who drink little to no alcohol. As the name implies, the main characteristic of NAFLD is too much fat stored in liver cells.\par NAFLD is increasingly common around the world, especially in Western nations. In the [place], it is the most common form of chronic liver disease, affecting about one-quarter of the population.\par Some individuals with NAFLD can develop nonalcoholic steatohepatitis (GONZALEZ), an aggressive form of fatty liver disease, which is marked by liver inflammation and may progress to advanced scarring (cirrhosis) and liver failure. This damage is similar to the damage caused by heavy alcohol use.\par \par Choose a healthy diet. Choose a healthy plant-based diet that's rich in fruits, vegetables, whole grains and healthy fats. Maintain a healthy weight. If you are overweight or obese, reduce the number of calories you eat each day and get more exercise. If you have a healthy weight, work to maintain it by choosing a healthy diet and exercising. Exercise. Exercise most days of the week. Get an OK from your doctor first if you haven't been exercising regularly\par \par We will repeat blood tests in 6 months and a sonogram yearly\par \par  \par 2- Abnormal liver function test- Ordered liver profile test. \par The numerous causes of increased liver tests- including some of the below,  were discussed at length and all questions were answered. \par Your doctor determines the specific cause of your elevated liver enzymes by reviewing your medications, your signs and symptoms and, in some cases, other tests and procedures.\par More common causes of elevated liver enzymes include:\par Fatty Liver \par Over-the-counter pain medications, particularly acetaminophen (Tylenol, others)\par Certain prescription medications, including statin drugs used to control cholesterol\par Drinking alcohol\par Heart failure \par Nonalcoholic fatty liver disease Obesity </diseases-conditions/obesity/symptoms-causes/[facility]-[id number]> \par Other causes of elevated liver enzymes may include:\par Celiac disease\par Hemochromatosis \par Liver cancer \par Mononucleosis\par Polymyositis \par Thyroid disorders\par \par 3- Obesity - Low fat diet discussed and nutritionist referral given\par \par

## 2020-08-18 NOTE — REVIEW OF SYSTEMS
[As Noted in HPI] : as noted in HPI [Negative] : Heme/Lymph [Abdominal Pain] : no abdominal pain [Constipation] : no constipation [Vomiting] : no vomiting [Diarrhea] : no diarrhea [Heartburn] : no heartburn [Melena] : no melena

## 2020-08-18 NOTE — HISTORY OF PRESENT ILLNESS
[de-identified] : Patient presents with abnormal LFTs and weight gain. Patient provided lab work from PCP with AST 32 and ALT 45.  Patient admits to being overweight and wanting help to lose weight.

## 2021-02-16 ENCOUNTER — EMERGENCY (EMERGENCY)
Facility: HOSPITAL | Age: 41
LOS: 1 days | Discharge: HOME | End: 2021-02-16
Attending: EMERGENCY MEDICINE | Admitting: EMERGENCY MEDICINE
Payer: COMMERCIAL

## 2021-02-16 VITALS
HEART RATE: 68 BPM | DIASTOLIC BLOOD PRESSURE: 65 MMHG | OXYGEN SATURATION: 99 % | SYSTOLIC BLOOD PRESSURE: 118 MMHG | TEMPERATURE: 98 F | RESPIRATION RATE: 17 BRPM

## 2021-02-16 VITALS
SYSTOLIC BLOOD PRESSURE: 145 MMHG | HEART RATE: 70 BPM | DIASTOLIC BLOOD PRESSURE: 69 MMHG | OXYGEN SATURATION: 99 % | HEIGHT: 68 IN | RESPIRATION RATE: 18 BRPM | TEMPERATURE: 98 F

## 2021-02-16 DIAGNOSIS — Z90.49 ACQUIRED ABSENCE OF OTHER SPECIFIED PARTS OF DIGESTIVE TRACT: Chronic | ICD-10-CM

## 2021-02-16 PROCEDURE — 93971 EXTREMITY STUDY: CPT | Mod: 26,LT

## 2021-02-16 PROCEDURE — 73502 X-RAY EXAM HIP UNI 2-3 VIEWS: CPT | Mod: 26,RT

## 2021-02-16 PROCEDURE — 99284 EMERGENCY DEPT VISIT MOD MDM: CPT

## 2021-02-16 RX ORDER — OXYCODONE HYDROCHLORIDE 5 MG/1
1 TABLET ORAL
Qty: 12 | Refills: 0
Start: 2021-02-16 | End: 2021-02-18

## 2021-02-16 RX ORDER — KETOROLAC TROMETHAMINE 30 MG/ML
30 SYRINGE (ML) INJECTION ONCE
Refills: 0 | Status: DISCONTINUED | OUTPATIENT
Start: 2021-02-16 | End: 2021-02-16

## 2021-02-16 RX ORDER — OXYCODONE AND ACETAMINOPHEN 5; 325 MG/1; MG/1
1 TABLET ORAL ONCE
Refills: 0 | Status: DISCONTINUED | OUTPATIENT
Start: 2021-02-16 | End: 2021-02-16

## 2021-02-16 RX ADMIN — Medication 30 MILLIGRAM(S): at 14:53

## 2021-02-16 RX ADMIN — OXYCODONE AND ACETAMINOPHEN 1 TABLET(S): 5; 325 TABLET ORAL at 14:54

## 2021-02-16 NOTE — ED PROVIDER NOTE - CLINICAL SUMMARY MEDICAL DECISION MAKING FREE TEXT BOX
pt with hip pain w/ hx of bursitis, unrelieved by nsaids, will give toradol/percocet ; xray r/o fx and gas, sono r/o DVT; positive family hx, pain control

## 2021-02-16 NOTE — ED PROVIDER NOTE - OBJECTIVE STATEMENT
41yo f right hip bursitis (2018 ) p/w right hip pain, pain for past few days, taking naproxen w/o relief. No fevers or chills. Pt had similar when she had a baby 3 years ago which she attributed to carrying her child on her hip and carrying the car seat often. Pt is currently fostering a six month old. No personal of PE or DVT. No recent travel. No hormone use. No recent surgery or trauma. No immobilization.  Father had PE in the past

## 2021-02-16 NOTE — ED PROVIDER NOTE - PHYSICAL EXAMINATION
GEN - NAD; well appearing; A+O x3   HEAD - NC/AT     EYES - EOMI, no conjunctival pallor, no scleral icterus  ENT -   mucous membranes  moist , no discharge      NECK - Neck supple  PULM - CTA b/l,  symmetric breath sounds  COR -  RRR, S1 S2, no murmurs  ABD - , ND, NT, soft, no guarding, no rebound, no masses    BACK - no CVA tenderness, nontender spine     EXTREMS - right hip fROM w/ some pain, no erythema.   SKIN - no rash or bruising      NEUROLOGIC - alert, sensation nl, motor 5/5 RUE/LUE/RLE/LLE

## 2021-02-16 NOTE — ED PROVIDER NOTE - PATIENT PORTAL LINK FT
You can access the FollowMyHealth Patient Portal offered by [facility] by registering at the following website: [email or web address]. By joining [facility]’s FollowMyHealth portal, you will also be able to view your health information using other applications (apps) compatible with our system.

## 2021-02-16 NOTE — ED PROVIDER NOTE - NSFOLLOWUPINSTRUCTIONS_ED_ALL_ED_FT
Hip Bursitis       Hip bursitis is inflammation of a fluid-filled sac (bursa) in the hip joint. The bursa prevents the bones in the hip joint from rubbing against each other. Hip bursitis can cause mild to moderate pain, and symptoms often come and go over time.      What are the causes?  This condition may be caused by:  •Injury to the hip.      •Overuse of the muscles that surround the hip joint.      •Previous injury or surgery of the hip.      •Arthritis or gout.      •Diabetes.      •Thyroid disease.      •Infection.      In some cases, the cause may not be known.      What are the signs or symptoms?  Symptoms of this condition include:  •Mild or moderate pain in the hip area. Pain may get worse with movement.      •Tenderness and swelling of the hip, especially on the outer side of the hip.      •In rare cases, the bursa may become infected. This may cause a fever, as well as warmth and redness in the area.      Symptoms may come and go.      How is this diagnosed?  This condition may be diagnosed based on:  •A physical exam.      •Your medical history.      •X-rays.      •Removal of fluid from your inflamed bursa for testing (biopsy).      You may be sent to a health care provider who specializes in bone diseases (orthopedist) or a provider who specializes in joint inflammation (rheumatologist).      How is this treated?  This condition is treated by resting, icing, applying pressure (compression), and raising (elevating) the injured area. This is called RICE treatment. In some cases, this may be enough to make your symptoms go away. Treatment may also include:  •Using crutches.      •Draining fluid out of the bursa to help relieve swelling.      •Injecting medicine that helps to reduce inflammation (cortisone).      •Additional medicines if the bursa is infected.        Follow these instructions at home:      Managing pain, stiffness, and swelling    •If directed, put ice on the painful area.  •Put ice in a plastic bag.      •Place a towel between your skin and the bag.      •Leave the ice on for 20 minutes, 2–3 times a day.      •Raise (elevate) your hip above the level of your heart as much as you can without pain. To do this, try putting a pillow under your hips while you lie down.        Activity     •Return to your normal activities as told by your health care provider. Ask your health care provider what activities are safe for you.      •Rest and protect your hip as much as possible until your pain and swelling get better.      General instructions     •Take over-the-counter and prescription medicines only as told by your health care provider.      •Wear compression wraps only as told by your health care provider.      • Do not use your hip to support your body weight until your health care provider says that you can. Use crutches as told by your health care provider.      •Gently massage and stretch your injured area as often as is comfortable.      •Keep all follow-up visits as told by your health care provider. This is important.        How is this prevented?    •Exercise regularly, as told by your health care provider.      •Warm up and stretch before being active.      •Cool down and stretch after being active.      •If an activity irritates your hip or causes pain, avoid the activity as much as possible.      •Avoid sitting down for long periods at a time.        Contact a health care provider if you:    •Have a fever.      •Develop new symptoms.      •Have difficulty walking or doing everyday activities.      •Have pain that gets worse or does not get better with medicine.      •Develop red skin or a feeling of warmth in your hip area.        Get help right away if you:    •Cannot move your hip.      •Have severe pain.        Summary    •Hip bursitis is inflammation of a fluid-filled sac (bursa) in the hip joint.      •Hip bursitis can cause mild to moderate pain, and symptoms often come and go over time.      •This condition is treated with rest, ice, compression, elevation, and medicines.      This information is not intended to replace advice given to you by your health care provider. Make sure you discuss any questions you have with your health care provider.      Document Revised: 08/26/2019 Document Reviewed: 08/26/2019    [employer] Patient Education © 2020 [employer]

## 2021-03-21 ENCOUNTER — EMERGENCY (EMERGENCY)
Facility: HOSPITAL | Age: 41
LOS: 1 days | Discharge: HOME | End: 2021-03-21
Attending: STUDENT IN AN ORGANIZED HEALTH CARE EDUCATION/TRAINING PROGRAM | Admitting: STUDENT IN AN ORGANIZED HEALTH CARE EDUCATION/TRAINING PROGRAM
Payer: COMMERCIAL

## 2021-03-21 VITALS
HEART RATE: 78 BPM | DIASTOLIC BLOOD PRESSURE: 73 MMHG | SYSTOLIC BLOOD PRESSURE: 150 MMHG | TEMPERATURE: 98 F | OXYGEN SATURATION: 100 % | RESPIRATION RATE: 16 BRPM

## 2021-03-21 VITALS
HEART RATE: 87 BPM | RESPIRATION RATE: 18 BRPM | SYSTOLIC BLOOD PRESSURE: 162 MMHG | OXYGEN SATURATION: 99 % | DIASTOLIC BLOOD PRESSURE: 95 MMHG | TEMPERATURE: 98 F | HEIGHT: 68 IN

## 2021-03-21 DIAGNOSIS — Z90.49 ACQUIRED ABSENCE OF OTHER SPECIFIED PARTS OF DIGESTIVE TRACT: Chronic | ICD-10-CM

## 2021-03-21 PROCEDURE — 99284 EMERGENCY DEPT VISIT MOD MDM: CPT

## 2021-03-21 RX ORDER — OXYCODONE HYDROCHLORIDE 5 MG/1
1 TABLET ORAL
Qty: 8 | Refills: 0
Start: 2021-03-21 | End: 2021-03-22

## 2021-03-21 RX ORDER — KETOROLAC TROMETHAMINE 30 MG/ML
30 SYRINGE (ML) INJECTION ONCE
Refills: 0 | Status: DISCONTINUED | OUTPATIENT
Start: 2021-03-21 | End: 2021-03-21

## 2021-03-21 RX ORDER — OXYCODONE AND ACETAMINOPHEN 5; 325 MG/1; MG/1
1 TABLET ORAL ONCE
Refills: 0 | Status: DISCONTINUED | OUTPATIENT
Start: 2021-03-21 | End: 2021-03-21

## 2021-03-21 RX ORDER — ACETAMINOPHEN 500 MG
975 TABLET ORAL ONCE
Refills: 0 | Status: COMPLETED | OUTPATIENT
Start: 2021-03-21 | End: 2021-03-21

## 2021-03-21 RX ADMIN — Medication 40 MILLIGRAM(S): at 20:46

## 2021-03-21 RX ADMIN — Medication 975 MILLIGRAM(S): at 20:47

## 2021-03-21 RX ADMIN — OXYCODONE AND ACETAMINOPHEN 1 TABLET(S): 5; 325 TABLET ORAL at 19:52

## 2021-03-21 RX ADMIN — Medication 30 MILLIGRAM(S): at 19:52

## 2021-03-21 NOTE — ED ADULT NURSE NOTE - HOW OFTEN DO YOU HAVE A DRINK CONTAINING ALCOHOL?
Progress Note (short form)





- Note


Progress Note: 





PULMONARY





States breathing slightly better. Less cough and wheezing.





 Vital Signs











 Period  Temp  Pulse  Resp  BP Sys/Baer  Pulse Ox


 


 Last 24 Hr  97.2 F-98.7 F    20-20  /58-74  95-97











Gen:  less tachypneic with speaking


Heart: RRR


Lung: distant breath sounds


Abd: soft, nontender


Ext: no edema





 CBC, BMP





 01/11/20 06:00 





 01/11/20 06:00 





Active Medications





Acetaminophen (Tylenol -)  650 mg PO Q4H PRN


   PRN Reason: FEVER


Albuterol Sulfate (Ventolin Hfa Inhaler -)  2 puff IH Q4H PRN


   PRN Reason: SHORT OF BREATH/WHEEZING


   Last Admin: 01/10/20 12:40 Dose:  2 puff


Albuterol/Ipratropium (Duoneb -)  1 amp NEB RQID [facility]


   Last Admin: 01/13/20 11:25 Dose:  1 amp


Atorvastatin Calcium (Lipitor -)  20 mg PO HS [facility]


   Last Admin: 01/12/20 22:18 Dose:  20 mg


Budesonide/Formoterol Fumarate (Symbicort 160/4.5mcg -)  2 puff IH BID [facility]


   Last Admin: 01/13/20 09:11 Dose:  2 puff


Cefuroxime Axetil (Ceftin -)  500 mg PO BIDWM [facility]


   Last Admin: 01/13/20 09:10 Dose:  500 mg


Clonazepam (Klonopin -)  0.5 mg PO BID [facility]


   Last Admin: 01/13/20 09:10 Dose:  0.5 mg


Diltiazem HCl (Cardizem -)  30 mg PO Q6HPO [facility]


   Last Admin: 01/13/20 11:38 Dose:  30 mg


Enoxaparin Sodium (Lovenox -)  40 mg SQ DAILY [facility]


   Last Admin: 01/13/20 09:11 Dose:  40 mg


Folic Acid (Folic Acid -)  1 mg PO DAILY [facility]


   Last Admin: 01/13/20 09:10 Dose:  1 mg


Methotrexate (Mexate -)  15 mg PO Q7D@1000 [facility]


   Last Admin: 01/08/20 09:36 Dose:  15 mg


Pantoprazole Sodium (Protonix -)  20 mg PO DAILY [facility]


   Last Admin: 01/13/20 09:10 Dose:  20 mg


Prednisone (Deltasone -)  30 mg PO DAILY [facility]


   Last Admin: 01/13/20 09:10 Dose:  30 mg


Ramipril (Altace -)  5 mg PO DAILY [facility]


   Last Admin: 01/13/20 09:10 Dose:  5 mg


Tiotropium Bromide (Spiriva Respimat)  2 puff IH DAILY [facility]


   Last Admin: 01/13/20 09:12 Dose:  2 puff








A/P


Acute on Chronic Hypoxic and Hypercapneic Respiratory Failure improving


Acute COPD Exacerbation


Pneumonia


Rheumatoid Arthritis


CAD s/p CABG


Lung Nodule


LV Diastolic Dysfunction


Pulmonary HTN


HTN


Hyperlipidemia





-  slow prednisone taper


-  inhaled bronchodilators


-  complete antibiotics


-  O2 to keep SpO2 >90%


-  BiPAP at night and as needed to assist in work of breathing


-  DVT prophylaxis Never

## 2021-03-21 NOTE — ED PROVIDER NOTE - NSFOLLOWUPINSTRUCTIONS_ED_ALL_ED_FT
Rest, ice, elevate area.      Take Naproxen as prescribed.   Take Prednisone 40mg once a day for 3 days.   Take Oxycodone 5mg every 6 hrs as needed for breakthrough discomfort- caution drowsiness while taking this medication- do not drive or operate heavy machinery.     Follow with PMD and orthopedic.       Any worsening pain, swelling, weakness, numbness, fever return to ED.

## 2021-03-21 NOTE — ED PROVIDER NOTE - PATIENT PORTAL LINK FT
You can access the FollowMyHealth Patient Portal offered by [facility] by registering at the following website: [email or web address]. By joining [facility]’s FollowMyHealth portal, you will also be able to view your health information using other applications (apps) compatible with our system.

## 2021-03-21 NOTE — ED ADULT NURSE NOTE - OBJECTIVE STATEMENT
39 yo F with PMHX of hip bursitis, here with complaint of right hip pain that first began approx one month ago, she was evaluated here at onset of pain had unremarkable xray and DVT study, dced with naprosyn, follow with her PMD, and placed on Prednisone x 20 days, which finished 2 weeks ago, pain since then has progressively worsened, worse over the past week. This is the same pain that she felt in 2018 when she was dxed with bursitis. Meds administered as ordered, will continue to monitor.

## 2021-03-21 NOTE — ED PROVIDER NOTE - LOWER EXTREMITY EXAM, RIGHT
TTP over hip, gluteal area. No deformity, no ecchymosis/ erythema, no warmth, FROM, distally NVI./TENDERNESS

## 2021-03-21 NOTE — ED PROVIDER NOTE - OBJECTIVE STATEMENT
39 yo F with PMHX of hip bursitis, here with complaint of right hip pain that first began approx one month ago, she was evaluated here at onset of pain had unremarkable xray and DVT study, dced with naprosyn, follow with her PMD, and placed on Prednisone x 20 days, which finished 2 weeks ago, pain since then has progressively worsened, worse over the past week. This is the same pain that she felt in 2018 when she was dxed with bursitis.   Denies fevers, abdominal pain, LE swelling, h/o DVT/ PE, saddle anesthesia, weakness, numbness tingling, trauma.  Has PMD appointment this week however, pain was severe had to come to the ED for eval.   Unable to get ortho appoint until mid april.

## 2021-03-21 NOTE — ED PROVIDER NOTE - PROGRESS NOTE DETAILS
Patient feeling better.   will dc with short course steroids as patient really would like to not take opioids if not needed.   I emailed. ED discharge for follow up.

## 2021-03-21 NOTE — ED PROVIDER NOTE - ATTENDING CONTRIBUTION TO CARE
Angelo SPAULDING: I agree with the above provided history and exam and addend/modify it as follows.    40F w/ pmh R hip bursitis in 2018 -- p/w R hip pain. Reports has had pain since 2/14, was prescribed tramadol and prednisone by pcp (for 20 days, finished 2 wks ago) w/out improvement, and that this pain is similar to prior bursitis. Pt previously got US and XR which were unremarkable in ED. Has been taking naproxen, but not today.     On exam, morbidly obese. R hip intact ROM throughout. neurovascular intact bilat LE. No back tenderness to palpation.     low suspicion for hip fx, dislocation, septic joint. Will give pain meds here, try to help arrange outpt ortho f/u; if improved, likely will dc w/ close outpt f/u    I Oli Stephens MD performed a history and physical exam of the patient and discussed their management with the resident and /or advanced care provider. I reviewed the resident and /or ACP's note and agree with the documented findings and plan of care. My medical decision making and observations are found above.

## 2021-03-21 NOTE — ED ADULT TRIAGE NOTE - CHIEF COMPLAINT QUOTE
Patient c/o R Hip pain, was diagnosed with Bursitis 2018.  States flare up started 2/14, finished Tramadol & Prednisone trial without improvement.  Denies any new trauma/injury.  Ambulatory in triage.  Took Naprosyn yesterday without relief.

## 2021-03-21 NOTE — ED PROVIDER NOTE - CLINICAL SUMMARY MEDICAL DECISION MAKING FREE TEXT BOX
41 yo F with PMHX of hip bursitis, here with complaint of right hip pain that first began approx one month ago, she was evaluated here at onset of pain had unremarkable xray and DVT study, dced with naprosyn, follow with her PMD, and placed on Prednisone x 20 days, which finished 2 weeks ago, pain since then has progressively worsened, worse over the past week. This is the same pain that she felt in 2018 when she was dxed with bursitis.   likely bursitis  no fever, skin changes, neuro deficits or red flags.   pain meds, facilitate ortho f/u

## 2021-03-22 PROBLEM — Z00.00 ENCOUNTER FOR PREVENTIVE HEALTH EXAMINATION: Status: ACTIVE | Noted: 2021-03-22

## 2021-03-23 ENCOUNTER — APPOINTMENT (OUTPATIENT)
Dept: ORTHOPEDIC SURGERY | Facility: CLINIC | Age: 41
End: 2021-03-23
Payer: COMMERCIAL

## 2021-03-23 VITALS — HEIGHT: 68 IN | WEIGHT: 260 LBS | BODY MASS INDEX: 39.4 KG/M2

## 2021-03-23 PROCEDURE — 99204 OFFICE O/P NEW MOD 45 MIN: CPT | Mod: 25

## 2021-03-23 PROCEDURE — 20611 DRAIN/INJ JOINT/BURSA W/US: CPT | Mod: RT

## 2021-03-23 PROCEDURE — 99072 ADDL SUPL MATRL&STAF TM PHE: CPT

## 2021-03-23 RX ORDER — PREDNISONE 50 MG/1
50 TABLET ORAL DAILY
Qty: 5 | Refills: 0 | Status: ACTIVE | COMMUNITY
Start: 2021-03-23 | End: 1900-01-01

## 2021-03-31 ENCOUNTER — APPOINTMENT (OUTPATIENT)
Dept: PHYSICAL MEDICINE AND REHAB | Facility: CLINIC | Age: 41
End: 2021-03-31
Payer: COMMERCIAL

## 2021-03-31 ENCOUNTER — APPOINTMENT (OUTPATIENT)
Dept: ORTHOPEDIC SURGERY | Facility: CLINIC | Age: 41
End: 2021-03-31
Payer: COMMERCIAL

## 2021-03-31 VITALS
DIASTOLIC BLOOD PRESSURE: 96 MMHG | TEMPERATURE: 96.9 F | HEART RATE: 82 BPM | OXYGEN SATURATION: 96 % | SYSTOLIC BLOOD PRESSURE: 150 MMHG

## 2021-03-31 VITALS — HEIGHT: 68 IN | WEIGHT: 255 LBS | BODY MASS INDEX: 38.65 KG/M2

## 2021-03-31 DIAGNOSIS — M67.951 UNSPECIFIED DISORDER OF SYNOVIUM AND TENDON, RIGHT THIGH: ICD-10-CM

## 2021-03-31 PROCEDURE — 99072 ADDL SUPL MATRL&STAF TM PHE: CPT

## 2021-03-31 PROCEDURE — 99214 OFFICE O/P EST MOD 30 MIN: CPT

## 2021-03-31 PROCEDURE — 99215 OFFICE O/P EST HI 40 MIN: CPT

## 2021-03-31 RX ORDER — MELOXICAM 15 MG/1
15 TABLET ORAL DAILY
Qty: 14 | Refills: 0 | Status: ACTIVE | COMMUNITY
Start: 2021-03-31 | End: 1900-01-01

## 2021-03-31 RX ORDER — CYCLOBENZAPRINE HYDROCHLORIDE 5 MG/1
5 TABLET, FILM COATED ORAL
Qty: 60 | Refills: 0 | Status: ACTIVE | COMMUNITY
Start: 2021-03-31 | End: 1900-01-01

## 2021-03-31 RX ORDER — CYCLOBENZAPRINE HYDROCHLORIDE 5 MG/1
5 TABLET, FILM COATED ORAL
Qty: 28 | Refills: 0 | Status: ACTIVE | COMMUNITY
Start: 2021-03-31 | End: 1900-01-01

## 2021-03-31 RX ORDER — PREDNISONE 50 MG/1
50 TABLET ORAL DAILY
Qty: 5 | Refills: 0 | Status: ACTIVE | COMMUNITY
Start: 2021-03-31 | End: 1900-01-01

## 2021-03-31 NOTE — ASSESSMENT
[FreeTextEntry1] : 41 yo F who presents with low back pain with radiation into her right lower extremity consistent with lumbar radiculopathy, lumbosacral stenosis, and lumbar disc herniation.\par \par Medical necessity MRI lumbar spine w/o contrast.\par Patient with severe low back pain refractory to greater than six weeks of Physician directed conservative care including PT/HEP, relative rest, several rounds of PO corticosteroids, chiropractic adjustments, oxycodone, and tramadol.  Patient reports that pain significantly diminishes her quality of life and capacity to perform her ADL's, IADL's, and  responsibilities.  On physical examination, patient demonstrates tenderness on palpation of the lumbar paraspinals, right sciatic notch and a positive straight leg raise.  Patient also demonstrates mild weakness in the distribution of the right L5 myotome.  An MRI lumbar spine w/o contrast is medically necessary in the management of Ms. Ledezma's low back pain and to guide the next step in management.\par \par -Ortho notes and [facility] ER notes reviewed\par -XR hip and LE duplex reviewed\par -MRI lumbar spine w/o contrast ordered\par -Start cyclobenzaprine 5 mg tablets, 1-2 tablets PO qHS PRN pain, dispense 30.  Patient denies a history of CHF, liver dysfunction or arrhythmias.   Patient warned about the sedative nature of this medication and recommended not to drive or to handle heavy machinery.\par -Start mobic 15 mg PO qdaily x 14 days, recommend to take with food.  Denies CKD, CAD, or gastritis.  Recommend that if patient develops GI symptoms including abdominal pain, nausea, or vomiting to discontinue use of medication immediately.\par -RTC following MRI.\par \par Miguel Taveras MD\par Spine and Sports Medicine\par \par [employer]\par At [facility]\par \par

## 2021-03-31 NOTE — HISTORY OF PRESENT ILLNESS
[FreeTextEntry1] : 41 yo F with no significant PMH who presents with low back pain.\par \par Onset:  2/15/21 while carrying her baby [place] [address] steps.  No inciting events, trauma, or falls.\par Location: right lower lumbar spine\par Characteristics: sharp\par Aggravating factors: prolonged sitting, heavy lifting, bending at the waist\par Alleviating factors: rest\par Radiation: right lower extremity\par Treatments: tylenol, NSAIDs, prednisone bursts, oxycodone, rest, physical therapy, chiropractic adjustment, HEP with minimal pain relief.  Patient underwent US guided GT bursa injection with Dr. Anand with some improvement in her pain.\par Severity: 7-9/10\par \par Diagnostic studies:\par MRI right hip 2018 showed greater trochanteric bursitis\par XR right hip no acute fractures or dislocations\par No recent imaging of the lumbar spine\par No previous EMG/NCS\par \par Patient denies new weakness, numbness or paresthesia.  Patient denies bowel/bladder dysfunction, fevers, chills, weight loss, night pain, or night sweats.\par

## 2021-03-31 NOTE — PHYSICAL EXAM
[FreeTextEntry1] : Gen: NAD\par HEENT: neck supple\par CV: no cyanosis\par Pulm: breathing well on room air\par Abd: soft\par Low back: range of motion limited by pain, tenderness to palpation lower lumbar paraspinals and right sciatic notch, +straight leg raise right lower extremities, neg FABERE, neg FAIR\par Msk: \par 5/5 hip flexion B/L, 5/5 knee extension B/L, 5/5 knee flexion B/L, 4+/5 R, 5/5 L dorsiflexion, 5/5 EHL B/L, 5/5 plantar flexion B/L\par 5/5 shoulder abduction B/L, 5/5 elbow flexion B/L, 5/5 elbow extension B/L, 5/5 wrist extension B/L, 5/5 hand  B/L\par Neuro: sensation intact to light touch in bilateral upper and lower extremities, reflexes 2+ brachioradialis, biceps, triceps bilaterally, reflexes 2+ patella, medial hamstring, achilles bilaterally, negative babinski, negative pink\par

## 2021-04-03 PROBLEM — M67.951 TENDINOPATHY OF RIGHT GLUTEAL REGION: Status: ACTIVE | Noted: 2021-03-23

## 2021-04-03 NOTE — PHYSICAL EXAM
[de-identified] : EXAM: \par Gen: in no acute distress, seated comfortably, moving easily\par Skin: No discoloration, rashes; on palpation skin is dry, \par Neuro: Normal sensation all dermatomes, motor all myotomes\par Vascular: Normal pulses, no edema, normal temperature\par Coordination and balance: Normal\par Psych: normal mood and affect, non pressured speech, alert and oriented x3\par Musculoskeletal:\par lumbar spine painful with forward flexion and extension\par    HIP /PELVIS -\par Inspection reveals no bruising\par Range of motion testing shows full passive and active\par no pain with resisted adduction\par no pain with resisted hip flexion\par Hip maneuvers:\par - ARLEY\par -FADIR\par passive hip impingement sign negative\par ARLEY negative\par Log roll negative\par Resisted active straight leg raise negative\par ++ TTP of the right  buttock, greater trochanters, IT band \par NEURO - Normal bulk and tone \par LE strength 5/5 including hip flexion, knee flexion, knee extension, ankle dorsiflexion, ankle plantarflexion, eversion, and EHL \par Sensation - intact to light touch in bilateral lower extremities. \par LE Reflexes 2+ patellar and Achilles reflexes bilaterally \par no Clonus\par Coordination was age appropriate and intact in all 4 limbs. \par GAIT - Normal base, normal stride length, non-antalgic\par

## 2021-04-03 NOTE — DISCUSSION/SUMMARY
[de-identified] : Alexandra presents for follow up of lateral hip pain, doing well. I have advised her to continue PT.  Can follow up in 2 months for repeat injection if needed.\par \par Carol Anand MD, EdM\par Sports Medicine [facility]\par Department of Orthopedics

## 2021-04-03 NOTE — HISTORY OF PRESENT ILLNESS
[de-identified] : Alexandra presents for follow up of leg pain.  She was last seen on 3/21 at which time she had a lateral hip injection.  She is also doing PT.  She reports that she feels better.

## 2021-04-12 ENCOUNTER — EMERGENCY (EMERGENCY)
Facility: HOSPITAL | Age: 41
LOS: 1 days | Discharge: HOME | End: 2021-04-12
Attending: EMERGENCY MEDICINE | Admitting: EMERGENCY MEDICINE
Payer: COMMERCIAL

## 2021-04-12 VITALS
TEMPERATURE: 98 F | RESPIRATION RATE: 18 BRPM | DIASTOLIC BLOOD PRESSURE: 85 MMHG | SYSTOLIC BLOOD PRESSURE: 168 MMHG | HEIGHT: 68 IN | HEART RATE: 85 BPM | OXYGEN SATURATION: 97 %

## 2021-04-12 DIAGNOSIS — Z90.49 ACQUIRED ABSENCE OF OTHER SPECIFIED PARTS OF DIGESTIVE TRACT: Chronic | ICD-10-CM

## 2021-04-12 LAB
ALBUMIN SERPL ELPH-MCNC: 4.2 G/DL — SIGNIFICANT CHANGE UP (ref 3.3–5)
ALP SERPL-CCNC: 44 U/L — SIGNIFICANT CHANGE UP (ref 40–120)
ALT FLD-CCNC: 48 U/L — HIGH (ref 4–33)
ANION GAP SERPL CALC-SCNC: 15 MMOL/L — HIGH (ref 7–14)
AST SERPL-CCNC: 42 U/L — HIGH (ref 4–32)
BASOPHILS # BLD AUTO: 0.04 K/UL — SIGNIFICANT CHANGE UP (ref 0–0.2)
BASOPHILS NFR BLD AUTO: 0.5 % — SIGNIFICANT CHANGE UP (ref 0–2)
BILIRUB SERPL-MCNC: 0.4 MG/DL — SIGNIFICANT CHANGE UP (ref 0.2–1.2)
BUN SERPL-MCNC: 14 MG/DL — SIGNIFICANT CHANGE UP (ref 7–23)
CALCIUM SERPL-MCNC: 9.1 MG/DL — SIGNIFICANT CHANGE UP (ref 8.4–10.5)
CHLORIDE SERPL-SCNC: 102 MMOL/L — SIGNIFICANT CHANGE UP (ref 98–107)
CO2 SERPL-SCNC: 22 MMOL/L — SIGNIFICANT CHANGE UP (ref 22–31)
CREAT SERPL-MCNC: 0.62 MG/DL — SIGNIFICANT CHANGE UP (ref 0.5–1.3)
EOSINOPHIL # BLD AUTO: 0.13 K/UL — SIGNIFICANT CHANGE UP (ref 0–0.5)
EOSINOPHIL NFR BLD AUTO: 1.6 % — SIGNIFICANT CHANGE UP (ref 0–6)
GLUCOSE SERPL-MCNC: 89 MG/DL — SIGNIFICANT CHANGE UP (ref 70–99)
HCT VFR BLD CALC: 41.5 % — SIGNIFICANT CHANGE UP (ref 34.5–45)
HGB BLD-MCNC: 13.5 G/DL — SIGNIFICANT CHANGE UP (ref 11.5–15.5)
IANC: 3.75 K/UL — SIGNIFICANT CHANGE UP (ref 1.5–8.5)
IMM GRANULOCYTES NFR BLD AUTO: 0.2 % — SIGNIFICANT CHANGE UP (ref 0–1.5)
LYMPHOCYTES # BLD AUTO: 3.16 K/UL — SIGNIFICANT CHANGE UP (ref 1–3.3)
LYMPHOCYTES # BLD AUTO: 39.5 % — SIGNIFICANT CHANGE UP (ref 13–44)
MCHC RBC-ENTMCNC: 27.5 PG — SIGNIFICANT CHANGE UP (ref 27–34)
MCHC RBC-ENTMCNC: 32.5 GM/DL — SIGNIFICANT CHANGE UP (ref 32–36)
MCV RBC AUTO: 84.5 FL — SIGNIFICANT CHANGE UP (ref 80–100)
MONOCYTES # BLD AUTO: 0.91 K/UL — HIGH (ref 0–0.9)
MONOCYTES NFR BLD AUTO: 11.4 % — SIGNIFICANT CHANGE UP (ref 2–14)
NEUTROPHILS # BLD AUTO: 3.75 K/UL — SIGNIFICANT CHANGE UP (ref 1.8–7.4)
NEUTROPHILS NFR BLD AUTO: 46.8 % — SIGNIFICANT CHANGE UP (ref 43–77)
NRBC # BLD: 0 /100 WBCS — SIGNIFICANT CHANGE UP
NRBC # FLD: 0 K/UL — SIGNIFICANT CHANGE UP
PLATELET # BLD AUTO: 271 K/UL — SIGNIFICANT CHANGE UP (ref 150–400)
POTASSIUM SERPL-MCNC: 4.4 MMOL/L — SIGNIFICANT CHANGE UP (ref 3.5–5.3)
POTASSIUM SERPL-SCNC: 4.4 MMOL/L — SIGNIFICANT CHANGE UP (ref 3.5–5.3)
PROT SERPL-MCNC: 7 G/DL — SIGNIFICANT CHANGE UP (ref 6–8.3)
RBC # BLD: 4.91 M/UL — SIGNIFICANT CHANGE UP (ref 3.8–5.2)
RBC # FLD: 12.8 % — SIGNIFICANT CHANGE UP (ref 10.3–14.5)
SARS-COV-2 RNA SPEC QL NAA+PROBE: SIGNIFICANT CHANGE UP
SODIUM SERPL-SCNC: 139 MMOL/L — SIGNIFICANT CHANGE UP (ref 135–145)
WBC # BLD: 8.01 K/UL — SIGNIFICANT CHANGE UP (ref 3.8–10.5)
WBC # FLD AUTO: 8.01 K/UL — SIGNIFICANT CHANGE UP (ref 3.8–10.5)

## 2021-04-12 PROCEDURE — 99220: CPT

## 2021-04-12 RX ORDER — OXYCODONE AND ACETAMINOPHEN 5; 325 MG/1; MG/1
1 TABLET ORAL ONCE
Refills: 0 | Status: DISCONTINUED | OUTPATIENT
Start: 2021-04-12 | End: 2021-04-12

## 2021-04-12 RX ORDER — DIAZEPAM 5 MG
5 TABLET ORAL ONCE
Refills: 0 | Status: DISCONTINUED | OUTPATIENT
Start: 2021-04-12 | End: 2021-04-12

## 2021-04-12 RX ORDER — KETOROLAC TROMETHAMINE 30 MG/ML
15 SYRINGE (ML) INJECTION ONCE
Refills: 0 | Status: DISCONTINUED | OUTPATIENT
Start: 2021-04-12 | End: 2021-04-12

## 2021-04-12 RX ORDER — OXYCODONE AND ACETAMINOPHEN 5; 325 MG/1; MG/1
1 TABLET ORAL EVERY 6 HOURS
Refills: 0 | Status: DISCONTINUED | OUTPATIENT
Start: 2021-04-12 | End: 2021-04-13

## 2021-04-12 RX ORDER — DEXAMETHASONE 0.5 MG/5ML
8 ELIXIR ORAL ONCE
Refills: 0 | Status: COMPLETED | OUTPATIENT
Start: 2021-04-12 | End: 2021-04-12

## 2021-04-12 RX ADMIN — OXYCODONE AND ACETAMINOPHEN 1 TABLET(S): 5; 325 TABLET ORAL at 22:22

## 2021-04-12 RX ADMIN — OXYCODONE AND ACETAMINOPHEN 1 TABLET(S): 5; 325 TABLET ORAL at 23:10

## 2021-04-12 RX ADMIN — Medication 5 MILLIGRAM(S): at 11:49

## 2021-04-12 RX ADMIN — OXYCODONE AND ACETAMINOPHEN 1 TABLET(S): 5; 325 TABLET ORAL at 15:37

## 2021-04-12 RX ADMIN — Medication 15 MILLIGRAM(S): at 11:49

## 2021-04-12 RX ADMIN — Medication 8 MILLIGRAM(S): at 18:58

## 2021-04-12 NOTE — ED ADULT NURSE NOTE - OBJECTIVE STATEMENT
Pt presents to ED with right hip pain radiating down the right leg for the past few months. Pt AxOx3, unable to ambulate at this time due to pain. Pt states she has seen multiple doctors for the pain and took flexeril prior to coming into the ER today with no relief. Pt states her doctor wants her to get a spinal injection for pain but she needs an MRI prior and was only able to get an appointment on Fri. Pt states the pain is better when she is lying down. Pt denies all other complaints at this time. Meds given as per MD orders, pt refused UCG prior to receiving meds states "I am not pregnant".

## 2021-04-12 NOTE — ED ADULT TRIAGE NOTE - CHIEF COMPLAINT QUOTE
pt c/o right leg pain x several months, worse since saturday.  pt has herniated disks in her back.  denies pain in her back

## 2021-04-12 NOTE — ED CDU PROVIDER INITIAL DAY NOTE - FAMILY HISTORY
Father  Still living? Unknown  Family history of cancer, Age at diagnosis: Age Unknown     Mother  Still living? Unknown  Family history of hypertension, Age at diagnosis: Age Unknown     Grandparent  Still living? Unknown  Family history of stroke, Age at diagnosis: Age Unknown

## 2021-04-12 NOTE — ED PROVIDER NOTE - PROGRESS NOTE DETAILS
JOHN Laws: Received signout on pt, will place pt in CDU for pain control, MRI, reassessment. JOHAN: pain  improving and able to ambulate to bathroom but still very uncomfortable

## 2021-04-12 NOTE — ED CDU PROVIDER INITIAL DAY NOTE - DETAILS
40 year old female with hx of herniated disc presenting with persistent low back pain with radiation to the right leg. HD stable. Neuro non-focal. Labs reviewed. Rt duplex negative for DV. Rt hip x-ray negative. CDU plan analgesia and MRI lumbar spine.
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
## 2021-04-12 NOTE — ED PROVIDER NOTE - CLINICAL SUMMARY MEDICAL DECISION MAKING FREE TEXT BOX
39 y/o F with known herniated lumbar disc presents to ED with acutely worsening pain and inability to walk. No red flag symptoms at this time to necessitate emergent imaging. Will attempt pain control and reassess. 39 y/o F with known herniated lumbar disc presents to ED with acutely worsening pain and inability to walk 2/2 pain. Pain typical of lumbar radiculopathy. No red flag symptoms, will attempt pain control and reassess. If persistent pain will place in CDU for MRI.

## 2021-04-12 NOTE — ED ADULT NURSE REASSESSMENT NOTE - NS ED NURSE REASSESS COMMENT FT1
pt states pain improved s/p medication administration but with movement pain becomes intense. Will continue to monitor.

## 2021-04-12 NOTE — ED PROVIDER NOTE - PHYSICAL EXAMINATION
GEN - NAD; well appearing; A+O x3   HEAD - NC/AT   EYES- PERRL, EOMI  ENT: Airway patent, mmm, Oral cavity and pharynx normal. No inflammation, swelling, exudate, or lesions.  NECK: Neck supple, non-tender without lymphadenopathy, no masses.  PULMONARY - CTA b/l, symmetric breath sounds.   CARDIAC -s1s2, RRR, no M,G,R  ABDOMEN - +BS, ND, NT, soft, no guarding, no rebound, no masses   BACK - no CVA tenderness, No midline spinal TTP  EXTREMITIES - FROM, symmetric pulses, capillary refill < 2 seconds, no edema   SKIN - no rash or bruising   NEUROLOGIC - alert, speech clear, full strength to lower extremities, decreased sensation to light touch over R posterior lower leg   PSYCH -nl mood/affect, nl insight.

## 2021-04-13 VITALS
SYSTOLIC BLOOD PRESSURE: 140 MMHG | TEMPERATURE: 98 F | RESPIRATION RATE: 16 BRPM | OXYGEN SATURATION: 97 % | DIASTOLIC BLOOD PRESSURE: 77 MMHG | HEART RATE: 81 BPM

## 2021-04-13 PROCEDURE — 72148 MRI LUMBAR SPINE W/O DYE: CPT | Mod: 26

## 2021-04-13 PROCEDURE — 99217: CPT

## 2021-04-13 RX ORDER — DEXAMETHASONE 0.5 MG/5ML
10 ELIXIR ORAL ONCE
Refills: 0 | Status: COMPLETED | OUTPATIENT
Start: 2021-04-13 | End: 2021-04-13

## 2021-04-13 RX ORDER — OXYCODONE AND ACETAMINOPHEN 5; 325 MG/1; MG/1
1 TABLET ORAL
Qty: 12 | Refills: 0
Start: 2021-04-13 | End: 2021-04-15

## 2021-04-13 RX ORDER — FAMOTIDINE 10 MG/ML
1 INJECTION INTRAVENOUS
Qty: 14 | Refills: 0
Start: 2021-04-13 | End: 2021-04-26

## 2021-04-13 RX ORDER — IBUPROFEN 200 MG
1 TABLET ORAL
Qty: 21 | Refills: 0
Start: 2021-04-13 | End: 2021-04-19

## 2021-04-13 RX ADMIN — OXYCODONE AND ACETAMINOPHEN 1 TABLET(S): 5; 325 TABLET ORAL at 11:54

## 2021-04-13 RX ADMIN — OXYCODONE AND ACETAMINOPHEN 1 TABLET(S): 5; 325 TABLET ORAL at 05:13

## 2021-04-13 RX ADMIN — OXYCODONE AND ACETAMINOPHEN 1 TABLET(S): 5; 325 TABLET ORAL at 12:54

## 2021-04-13 RX ADMIN — Medication 102 MILLIGRAM(S): at 11:53

## 2021-04-13 RX ADMIN — OXYCODONE AND ACETAMINOPHEN 1 TABLET(S): 5; 325 TABLET ORAL at 04:37

## 2021-04-13 RX ADMIN — Medication 10 MILLIGRAM(S): at 12:54

## 2021-04-13 NOTE — ED CDU PROVIDER SUBSEQUENT DAY NOTE - PROGRESS NOTE DETAILS
tobin andersen: pt rested comfortably in bed all night, had no complaints, awaiting mri/mri results, had no complaints of pain overnight, will reasses Spoke w/ patient's Spine/Ortho Dr. Taveras, d/c to f/u in office this week. Pt ambulatory, stable to be discharged from CDU.

## 2021-04-13 NOTE — ED CDU PROVIDER DISPOSITION NOTE - ATTENDING CONTRIBUTION TO CARE
Katarina: I have seen and examined the patient face to face, have reviewed and addended the HPI, PE and a/p as necessary.

## 2021-04-13 NOTE — ED CDU PROVIDER DISPOSITION NOTE - NSFOLLOWUPINSTRUCTIONS_ED_ALL_ED_FT
Advance activity as tolerated.  Continue all previously prescribed medications as directed unless otherwise instructed.  Follow up with your primary care physician in 48-72 hours- bring copies of your results.  Return to the ER for worsening or persistent symptoms, and/or ANY NEW OR CONCERNING SYMPTOMS. If you have issues obtaining follow up, please call: [phone] to obtain a doctor or specialist who takes your insurance in your area.  You may call [phone] to make an appointment with the internal medicine clinic.     Follow up with your orthopedic doctor within one week.  Take Motrin 600mg every 8 hours as needed for pain, take with food. Take Percocet 325/5 once every 6 hours as needed for severe pain -- causes drowsiness; DO NOT drink alcohol, drive, or operate heavy machinery with this medication.  Caution federal law prohibits the transfer of this drug to any person other  than the person for whom it was prescribed. May cause drowsiness.  Alcohol may intensify this effect.  Use care when operating dangerous machinery.  This prescription cannot be refilled. Using more of this medication than prescribed may cause serious breathing problems   Return to the emergency department with any worsening or concerning symptoms, swelling, numbness/tingling, inability to bear weight or any other concerns.

## 2021-04-13 NOTE — ED CDU PROVIDER SUBSEQUENT DAY NOTE - ATTENDING CONTRIBUTION TO CARE
Katarina: I have seen and examined the patient face to face, have reviewed and addended the HPI, PE and a/p as necessary.     40 year old female with herniated disc a/w persistent low back pain with radiation to the right leg.  Pt received MR with notable L5-S1 right sided extruded disc herniation with inferior migration and impingement upon the S1 nerve root and T12-L1 small right parasagittal disc protrusion.  Discussed plan with outpatient ortho-spine physician prior to discharge.      On exam,  GEN - NAD; well appearing; A+O x3; non-toxic appearing; CARD -s1s2, RRR, no M,G,R; PULM - CTA b/l, symmetric breath sounds; ABD -  +BS, ND, NT, soft, no guarding, no rebound, no masses; BACK - no CVA tenderness, R sided paraspinal tenderness at L3-5 region; EXT - symmetric pulses, 2+ dp, capillary refill < 2 seconds, no cyanosis, no edema; NEURO: alert, CN 2-12 intact, reflexes nl, sensation nl, coordination nl, finger to nose nl, romberg negative, motor 5/5 RUE/LUE/RLE/LLE/EHL/Plantar flexion, no pronator drift, gait limited 2/2 pain.      Pt stable for discharge with outpt follow up with Dr. Taveras from orthospine.  Will d/c steroids, and nsaids.

## 2021-04-13 NOTE — ED CDU PROVIDER DISPOSITION NOTE - CARE PROVIDER_API CALL
Miguel Taveras)  PhysicalRehab Medicine  [facility], [address]th [place] [address]  Phone: [phone]  Fax: [phone]  Follow Up Time:

## 2021-04-13 NOTE — ED CDU PROVIDER SUBSEQUENT DAY NOTE - PHYSICAL EXAMINATION
no midline tnderness + full rom of back  + staright leg on right leg at 60 degrees, no erythema/warmth

## 2021-04-13 NOTE — ED CDU PROVIDER DISPOSITION NOTE - CLINICAL COURSE
40 year old female with herniated disc a/w persistent low back pain with radiation to the right leg.  Pt received MR with notable L5-S1 right sided extruded disc herniation with inferior migration and impingement upon the S1 nerve root and T12-L1 small right parasagittal disc protrusion.  Discussed plan with outpatient ortho-spine physician prior to discharge with medrol dose pack and nsaids.

## 2021-04-13 NOTE — ED CDU PROVIDER DISPOSITION NOTE - PATIENT PORTAL LINK FT
You can access the FollowMyHealth Patient Portal offered by [facility] by registering at the following website: [email or web address]. By joining [facility]’s FollowMyHealth portal, you will also be able to view your health information using other applications (apps) compatible with our system.

## 2021-04-14 ENCOUNTER — APPOINTMENT (OUTPATIENT)
Dept: PHYSICAL MEDICINE AND REHAB | Facility: CLINIC | Age: 41
End: 2021-04-14
Payer: COMMERCIAL

## 2021-04-14 ENCOUNTER — APPOINTMENT (OUTPATIENT)
Dept: MRI IMAGING | Facility: CLINIC | Age: 41
End: 2021-04-14

## 2021-04-14 VITALS
HEART RATE: 74 BPM | TEMPERATURE: 97.6 F | SYSTOLIC BLOOD PRESSURE: 148 MMHG | OXYGEN SATURATION: 99 % | DIASTOLIC BLOOD PRESSURE: 86 MMHG

## 2021-04-14 DIAGNOSIS — M54.16 RADICULOPATHY, LUMBAR REGION: ICD-10-CM

## 2021-04-14 DIAGNOSIS — M48.07 SPINAL STENOSIS, LUMBOSACRAL REGION: ICD-10-CM

## 2021-04-14 DIAGNOSIS — M51.26 OTHER INTERVERTEBRAL DISC DISPLACEMENT, LUMBAR REGION: ICD-10-CM

## 2021-04-14 PROCEDURE — 99072 ADDL SUPL MATRL&STAF TM PHE: CPT

## 2021-04-14 PROCEDURE — 99214 OFFICE O/P EST MOD 30 MIN: CPT

## 2021-04-14 NOTE — PHYSICAL EXAM
[FreeTextEntry1] : Gen: NAD\par HEENT: neck supple\par CV: no cyanosis\par Pulm: breathing well on room air\par Abd: soft\par Low back: range of motion limited by pain, tenderness to palpation lower lumbar paraspinals and right sciatic notch, +straight leg raise right lower extremities, neg facet loading, neg FABERE, neg FAIR\par Msk: \par 5/5 hip flexion B/L, 5/5 knee extension B/L, 5/5 knee flexion B/L, 4+/5 R, 5/5 L dorsiflexion, 5/5 EHL B/L, 5/5 plantar flexion B/L\par 5/5 shoulder abduction B/L, 5/5 elbow flexion B/L, 5/5 elbow extension B/L, 5/5 wrist extension B/L, 5/5 hand  B/L\par Neuro: sensation intact to light touch in bilateral upper and lower extremities, reflexes 2+ brachioradialis, biceps, triceps bilaterally, reflexes 2+ patella, medial hamstring, achilles bilaterally, negative babinski, negative pink\par

## 2021-04-14 NOTE — HISTORY OF PRESENT ILLNESS
[FreeTextEntry1] : 4/14/21\par 39 yo F who presents for follow up with low back pain.  Patient reports that she was admitted to the hospital due to significant low back pain.  Patient underwent MRI lumbar spine w/o contrast showing significant disc extrusion at L5-S1 leading to impingement of the right S1 nerve root.  Patient was treated with IV and PO corticosteroids with some improvement in her pain.  Patient denies new weakness, numbness or paresthesia.  Denies bowel/bladder dysfunction, fevers, chills, weight loss, night pain, or night sweats.\par \par 3/31/21\par 39 yo F with no significant PMH who presents with low back pain.\par \par Onset:  2/15/21 while carrying her baby [place] [address] steps.  No inciting events, trauma, or falls.\par Location: right lower lumbar spine\par Characteristics: sharp\par Aggravating factors: prolonged sitting, heavy lifting, bending at the waist\par Alleviating factors: rest\par Radiation: right lower extremity\par Treatments: tylenol, NSAIDs, prednisone bursts, oxycodone, rest, physical therapy, chiropractic adjustment, HEP with minimal pain relief.  Patient underwent US guided GT bursa injection with Dr. Anand with some improvement in her pain.\par Severity: 7-9/10\par \par Diagnostic studies:\par MRI right hip 2018 showed greater trochanteric bursitis\par XR right hip no acute fractures or dislocations\par No recent imaging of the lumbar spine\par No previous EMG/NCS\par \par Patient denies new weakness, numbness or paresthesia.  Patient denies bowel/bladder dysfunction, fevers, chills, weight loss, night pain, or night sweats.\par

## 2021-04-14 NOTE — ASSESSMENT
[FreeTextEntry1] : 41 yo F who presents with low back pain with radiation into her right lower extremity consistent with lumbar radiculopathy, lumbosacral stenosis, and lumbar disc herniation.\par \par Several treatment options discussed with Ms. San on this visit including referral to Spine Surgery.  Patient however is reluctant to pursue surgical intervention at this time.  Will proceed with the following treatment plan.\par \par -Ortho notes and [facility] ER notes reviewed\par -MRI lumbar spine w/o contrast reviewed\par -Cont. cyclobenzaprine 5 mg tablets, 1-2 tablets PO qHS PRN pain, dispense 30.  Patient denies a history of CHF, liver dysfunction or arrhythmias.   Patient warned about the sedative nature of this medication and recommended not to drive or to handle heavy machinery.\par -I recommend that patient undergo right L5-S1, S1 TFESI.  Risks and benefits discussed with patient.  Will submit for insurance approval.  Once insurance approval has been obtained, patient will be contacted to schedule injection at out-patient ambulatory center.  Covid-19 referral provided to patient on this visit and patient has been instructed to undergo testing per unit protocol.  Covid-19 vaccination policy of our ASC reviewed and patient informed that no spinal injection will be allowed should patient have had the vaccine in the last 14 days or are planning to get the vaccine in the next 14 days.\par \par Miguel Taveras MD\par Spine and Sports Medicine\par \par [employer]\par [facility]\par \par

## 2021-05-14 ENCOUNTER — APPOINTMENT (OUTPATIENT)
Dept: PHYSICAL MEDICINE AND REHAB | Facility: CLINIC | Age: 41
End: 2021-05-14

## 2021-07-02 ENCOUNTER — APPOINTMENT (OUTPATIENT)
Dept: PHYSICAL MEDICINE AND REHAB | Facility: CLINIC | Age: 41
End: 2021-07-02

## 2021-08-20 NOTE — ED CDU PROVIDER INITIAL DAY NOTE - PROGRESS NOTE DETAILS
tobin andersen: pt resting comfortably in bed, states that the pain is much beter, will monitor pain control, get mri and reasses
#1:
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
## 2022-01-26 NOTE — PATIENT PROFILE ADULT. - PATIENT REPRESENTATIVE PHONE
Your current Orthopaedic problem we are working together to treat is:  Right elbow pain- suspect tendinosis, but not classic for one area. Medial and lateral and even triceps tendon. There is also some irritation of the right wrist and shoulder..    CARE:  • Physical therapy  • Occupational therapy for ergonomics assessment.  • Wrist brace during times of pain.  • MRI right elbow if not improving (call/send a message for order)  • Topical Voltaren gel or oral ibuprofen sparingly as needed.      PHYSICAL THERAPY/OCCUPATIONAL THERAPY  Physical therapy and occupational therapy will help your recovery. Please make your appointments at the  or call for 12 visits at [facility] [place], Sports Medicine Hotline [phone]. It would be advisable to follow up with me upon completion of your therapy.      It is recommended you schedule a follow-up appointment with Gilberto Garcia MD in 4-6 weeks or after MRI if not improving. If you have trouble getting a sooner appointment please ask to speak with ERASMO Rosenberg or send us a message.       Office hours are 8:00 am to 5:00 pm Monday through Friday. If it is urgent that you speak with someone outside of these hours, our [facility] Call Center will be able to assist you. You can reach the office by calling the [facility] at [phone], [place] at [phone], or [place] Office at [phone].     We do highly recommend [place], if you do not already have this. You can request access via the internet or by simply talking with a  at any of the clinics.   [email or web address].      Thank you for choosing [facility] as your Orthopedic provider!      
[phone]

## 2023-07-11 NOTE — ED CDU PROVIDER INITIAL DAY NOTE - NS ED ATTENDING STATEMENT MOD
I have personally performed a face to face diagnostic evaluation on this patient. I have reviewed the ACP note and agree with the history, exam and plan of care, except as noted.
Home

## 2024-01-18 ENCOUNTER — TRANSCRIPTION ENCOUNTER (OUTPATIENT)
Age: 44
End: 2024-01-18

## 2024-01-19 ENCOUNTER — OUTPATIENT (OUTPATIENT)
Dept: OUTPATIENT SERVICES | Facility: HOSPITAL | Age: 44
LOS: 1 days | End: 2024-01-19
Payer: COMMERCIAL

## 2024-01-19 ENCOUNTER — TRANSCRIPTION ENCOUNTER (OUTPATIENT)
Age: 44
End: 2024-01-19

## 2024-01-19 VITALS
DIASTOLIC BLOOD PRESSURE: 54 MMHG | RESPIRATION RATE: 16 BRPM | SYSTOLIC BLOOD PRESSURE: 103 MMHG | OXYGEN SATURATION: 98 % | HEART RATE: 63 BPM

## 2024-01-19 VITALS
HEIGHT: 68 IN | WEIGHT: 182.1 LBS | SYSTOLIC BLOOD PRESSURE: 114 MMHG | DIASTOLIC BLOOD PRESSURE: 63 MMHG | OXYGEN SATURATION: 97 % | RESPIRATION RATE: 14 BRPM | TEMPERATURE: 98 F | HEART RATE: 59 BPM

## 2024-01-19 DIAGNOSIS — Z98.890 OTHER SPECIFIED POSTPROCEDURAL STATES: Chronic | ICD-10-CM

## 2024-01-19 DIAGNOSIS — O02.1 MISSED ABORTION: ICD-10-CM

## 2024-01-19 DIAGNOSIS — Z01.818 ENCOUNTER FOR OTHER PREPROCEDURAL EXAMINATION: ICD-10-CM

## 2024-01-19 DIAGNOSIS — Z90.49 ACQUIRED ABSENCE OF OTHER SPECIFIED PARTS OF DIGESTIVE TRACT: Chronic | ICD-10-CM

## 2024-01-19 DIAGNOSIS — Z90.3 ACQUIRED ABSENCE OF STOMACH [PART OF]: Chronic | ICD-10-CM

## 2024-01-19 LAB
ABO RH CONFIRMATION: SIGNIFICANT CHANGE UP
HCG UR QL: POSITIVE
HCT VFR BLD CALC: 38.2 % — SIGNIFICANT CHANGE UP (ref 34.5–45)
HGB BLD-MCNC: 12.5 G/DL — SIGNIFICANT CHANGE UP (ref 11.5–15.5)
MCHC RBC-ENTMCNC: 28.7 PG — SIGNIFICANT CHANGE UP (ref 27–34)
MCHC RBC-ENTMCNC: 32.7 GM/DL — SIGNIFICANT CHANGE UP (ref 32–36)
MCV RBC AUTO: 87.8 FL — SIGNIFICANT CHANGE UP (ref 80–100)
NRBC # BLD: 0 /100 WBCS — SIGNIFICANT CHANGE UP (ref 0–0)
PLATELET # BLD AUTO: 295 K/UL — SIGNIFICANT CHANGE UP (ref 150–400)
RBC # BLD: 4.35 M/UL — SIGNIFICANT CHANGE UP (ref 3.8–5.2)
RBC # FLD: 12.4 % — SIGNIFICANT CHANGE UP (ref 10.3–14.5)
WBC # BLD: 9.42 K/UL — SIGNIFICANT CHANGE UP (ref 3.8–10.5)
WBC # FLD AUTO: 9.42 K/UL — SIGNIFICANT CHANGE UP (ref 3.8–10.5)

## 2024-01-19 PROCEDURE — 86901 BLOOD TYPING SEROLOGIC RH(D): CPT

## 2024-01-19 PROCEDURE — 88305 TISSUE EXAM BY PATHOLOGIST: CPT | Mod: 26

## 2024-01-19 PROCEDURE — 86077 PHYS BLOOD BANK SERV XMATCH: CPT

## 2024-01-19 PROCEDURE — 86900 BLOOD TYPING SEROLOGIC ABO: CPT

## 2024-01-19 PROCEDURE — 36415 COLL VENOUS BLD VENIPUNCTURE: CPT

## 2024-01-19 PROCEDURE — 59820 CARE OF MISCARRIAGE: CPT

## 2024-01-19 PROCEDURE — 81025 URINE PREGNANCY TEST: CPT

## 2024-01-19 PROCEDURE — 86870 RBC ANTIBODY IDENTIFICATION: CPT

## 2024-01-19 PROCEDURE — 88305 TISSUE EXAM BY PATHOLOGIST: CPT

## 2024-01-19 PROCEDURE — 85027 COMPLETE CBC AUTOMATED: CPT

## 2024-01-19 PROCEDURE — 86850 RBC ANTIBODY SCREEN: CPT

## 2024-01-19 PROCEDURE — 86880 COOMBS TEST DIRECT: CPT

## 2024-01-19 RX ORDER — HYDROMORPHONE HYDROCHLORIDE 2 MG/ML
0.5 INJECTION INTRAMUSCULAR; INTRAVENOUS; SUBCUTANEOUS ONCE
Refills: 0 | Status: DISCONTINUED | OUTPATIENT
Start: 2024-01-19 | End: 2024-01-19

## 2024-01-19 RX ORDER — SODIUM CHLORIDE 9 MG/ML
1000 INJECTION, SOLUTION INTRAVENOUS
Refills: 0 | Status: DISCONTINUED | OUTPATIENT
Start: 2024-01-19 | End: 2024-01-19

## 2024-01-19 RX ORDER — ONDANSETRON 8 MG/1
4 TABLET, FILM COATED ORAL ONCE
Refills: 0 | Status: DISCONTINUED | OUTPATIENT
Start: 2024-01-19 | End: 2024-01-19

## 2024-01-19 RX ORDER — ACETAMINOPHEN 500 MG
1000 TABLET ORAL ONCE
Refills: 0 | Status: DISCONTINUED | OUTPATIENT
Start: 2024-01-19 | End: 2024-01-19

## 2024-01-19 NOTE — BRIEF OPERATIVE NOTE - OPERATION/FINDINGS
External Genitalia: Grossly normal  Vagina: Normal mucosa  Cervix: Grossly normal  Uterus: Sounded to 11cm    Patient was positioned, cleansed and draped in a sterile fashion. Urinary catheter inserted with 100mL of output. Anterior cervix grasped with tenaculum and cervical dilation was done without complications. Suction curette inserted and curettage performed. Cytotec was administered intraoperatively. Active bleeding ceased. All instruments removed. Patient taken stable to Recovery Room.

## 2024-01-19 NOTE — ASU DISCHARGE PLAN (ADULT/PEDIATRIC) - CARE PROVIDER_API CALL
Patricio Kwok  Obstetrics and Gynecology  [address] [place] [id number]  Phone: [phone]  Fax: [phone]  Follow Up Time: 2 weeks

## 2024-01-19 NOTE — ASU DISCHARGE PLAN (ADULT/PEDIATRIC) - PAIN MANAGEMENT
Take over the counter pain medication NO TYLENOL UNTIL 8 PM tonight if needed/Take over the counter pain medication

## 2024-01-19 NOTE — H&P PST ADULT - ASSESSMENT
This is a 43 year old alert and oriented female  with a PMH and PSH significant for anxiety,  back and neck surgery and gastric sleeve here for surgery. She is scheduled for suction Dilation and Curettage miss w/karyotyping with Dr. Saucedo on 2024.

## 2024-01-19 NOTE — ASU DISCHARGE PLAN (ADULT/PEDIATRIC) - NS MD DC FALL RISK RISK
For information on Fall & Injury Prevention, visit: [email or web address] OR  [email or web address] OR  https://www.cdc.gov/steadi/patient.html

## 2024-01-19 NOTE — H&P PST ADULT - NSICDXPASTMEDICALHX_GEN_ALL_CORE_FT
PAST MEDICAL HISTORY:  Acute gastritis without hemorrhage, unspecified gastritis type     Anxiety     Missed

## 2024-01-19 NOTE — BRIEF OPERATIVE NOTE - NSICDXBRIEFPROCEDURE_GEN_ALL_CORE_FT
PROCEDURES:  Dilation and curettage, uterus, using suction, for missed first trimester  2024 14:09:00  Patricio Kwok

## 2024-01-19 NOTE — H&P PST ADULT - HISTORY OF PRESENT ILLNESS
This is a 43 year old alert and oriented female  with a PMH and PSH significant for anxiety,  back and neck surgery and gastric sleeve here for surgery. She is scheduled for suction Dilation and Curettage miss w/karyotyping with Dr. Saucedo on 2024.    Reports going for Pelvic sono after her pregnancy was confirmed with LMP on 2023, Sono recording absent FHR and after discussing the treatment option with the surgeon elected for the proposed surgery.    Denies any bleeding, abd cramp, cp, sob, fatigue, bleeding or spotting at this time.

## 2024-01-19 NOTE — H&P PST ADULT - NSICDXFAMILYHX_GEN_ALL_CORE_FT
FAMILY HISTORY:  Father  Still living? Unknown  Family history of cancer, Age at diagnosis: Age Unknown    Mother  Still living? Unknown  Family history of hypertension, Age at diagnosis: Age Unknown    Grandparent  Still living? Unknown  Family history of stroke, Age at diagnosis: Age Unknown

## 2024-01-19 NOTE — H&P PST ADULT - RESPIRATORY RATE (BREATHS/MIN)
Vital Signs: I have reviewed the initial vital signs.  Constitutional: NAD, well-nourished, appears stated age, no acute distress.  HEENT: Airway patent, moist MM, no erythema/swelling/deformity of oral structures. significant tenderness with mouth opening. EOMI, PERRLA.  CV: regular rate, regular rhythm, well-perfused extremities, 2+ b/l DP and radial pulses equal.  Lungs: BCTA, no increased WOB.  ABD: NTND, no guarding or rebound, no pulsatile mass, no hernias.   MSK: R anterior significant LAD with severe ttp. Neck otherwise supple, nontender, nl ROM, no stepoff. Chest nontender. Back nontender in TLS spine or to b/l bony structures or flanks. Ext nontender, nl rom, no deformity.   INTEG: Skin warm, dry, no rash.  NEURO: A&Ox3, moving all extremities, normal speech  PSYCH: Calm, cooperative, normal affect and interaction. 19

## 2024-01-19 NOTE — H&P PST ADULT - PROBLEM SELECTOR PLAN 1
She is scheduled for suction Dilation and Curettage miss w/karyotyping with Dr. Saucedo on 01/19/2024.

## 2024-01-19 NOTE — H&P PST ADULT - NSICDXPASTSURGICALHX_GEN_ALL_CORE_FT
PAST SURGICAL HISTORY:  H/O gastric sleeve     H/O neck surgery     History of cholecystectomy     Previous back surgery

## 2024-01-19 NOTE — H&P PST ADULT - PROBLEM SELECTOR PLAN 2
- Following labs were ordered- CBC,  Type and Screen  -No illegal drugs for 7 days prior to surgery and no alcohol 24 hrs before procedure.  -Preop instructions explained. Verbalized understanding.

## 2024-01-24 LAB — SURGICAL PATHOLOGY STUDY: SIGNIFICANT CHANGE UP

## 2024-02-06 ENCOUNTER — APPOINTMENT (OUTPATIENT)
Dept: ANTEPARTUM | Facility: CLINIC | Age: 44
End: 2024-02-06

## 2024-11-09 NOTE — ED ADULT TRIAGE NOTE - NS ED NOTE AC HIGH RISK COUNTRIES
Pulmonary Function Test:     Paulina Man is a 89 year old female who presents for evaluation of dyspnea exertion    Findings:  Spirometry: FEV1 is 1.07 L, 71 predicted.  With a Z-score of -1.37  FVC is 1.87 L, 93% predicted and FEV1/ FVC ratio is 0.57.  With a Z-score of -2.18  There is no significant bronchodilator response after administration of albuterol.   The flow-volume loop demonstrates an obstructive pattern.   Lung Volumes:                                                                     The TLC is 4.37 L, 102% predicted.  With a Z-score of 0.13  The residual volume 2.50 L, 129% predicted.  With a Z-score of 0.91  Diffusion Capacity:  The diffusion capacity is 8.87 or 54% predicted with a Z-score of -3.15 and 74% predicted when corrected for alveolar volume.     Impression:  There is mild airway obstruction  on spirometry as FEV1/FVC ratio is reduced and visualized on flow-volume loop.  Lung volumes appear within normal limits..     Diffusion capacity  is moderately reduced . This may represent a normal finding but can be seen in emphysema, interstitial lung disease, pulmonary vascular disease (such as pulmonary hypertension), atelectasis and anemia. If not already performed, would suggest further evaluation as determined clinically.    There are no previous pulmonary function tests available for comparison.     Disclaimer: This PFT has been interpreted in accordance to ATS/ERS interpretation guidelines 2022, with the use of upper and lower limits of normal as well as z-score references. Previous testing (before March 2024) was not performed at [facility] using z-scores and so comparison to previous testing should be taken with caution.    Bebeto Blackwell MD       No

## (undated) DEVICE — SOL IRR POUR NS 0.9% 1000ML

## (undated) DEVICE — SOL IRR POUR H2O 1000ML

## (undated) DEVICE — GOWN XL W TOWEL

## (undated) DEVICE — VACUUM CURETTE BERKLEY OLYMPUS F TIP 6MM

## (undated) DEVICE — VENODYNE/SCD SLEEVE CALF MEDIUM

## (undated) DEVICE — GLV 7.5 PROTEXIS (BLUE)

## (undated) DEVICE — CONTAINER SPECIMEN 4OZ

## (undated) DEVICE — VENODYNE/SCD SLEEVE CALF LARGE

## (undated) DEVICE — PSP-SCD MACHINE: Type: DURABLE MEDICAL EQUIPMENT

## (undated) DEVICE — PREP TRAY DRY SKIN PREP SCRUB

## (undated) DEVICE — VACUUM CURETTE BERKLEY OLYMPUS CURVED 7MM

## (undated) DEVICE — CANISTER SUCTION 2000CC

## (undated) DEVICE — WARMING BLANKET UPPER ADULT

## (undated) DEVICE — DRAPE LIGHT HANDLE COVER (GREEN)

## (undated) DEVICE — TUBING GYRUS ACMI COLLECTION SET 6FT

## (undated) DEVICE — VACUUM CURETTE BERKLEY OLYMPUS CURVED 8MM

## (undated) DEVICE — DRAPE TOWEL BLUE 17" X 24"

## (undated) DEVICE — PACK LITHOTOMY

## (undated) DEVICE — GLV 7.5 PROTEXIS (WHITE)